# Patient Record
Sex: MALE | Race: WHITE | NOT HISPANIC OR LATINO | URBAN - METROPOLITAN AREA
[De-identification: names, ages, dates, MRNs, and addresses within clinical notes are randomized per-mention and may not be internally consistent; named-entity substitution may affect disease eponyms.]

---

## 2018-08-24 LAB — CRC RECOMMENDATION EXT: NORMAL

## 2021-04-16 ENCOUNTER — OFFICE VISIT (OUTPATIENT)
Dept: FAMILY MEDICINE | Facility: CLINIC | Age: 56
End: 2021-04-16
Payer: MEDICAID

## 2021-04-16 VITALS
TEMPERATURE: 97 F | RESPIRATION RATE: 16 BRPM | BODY MASS INDEX: 34.57 KG/M2 | DIASTOLIC BLOOD PRESSURE: 86 MMHG | HEART RATE: 70 BPM | WEIGHT: 278 LBS | OXYGEN SATURATION: 94 % | HEIGHT: 75 IN | SYSTOLIC BLOOD PRESSURE: 132 MMHG

## 2021-04-16 DIAGNOSIS — L30.9 DERMATITIS: ICD-10-CM

## 2021-04-16 DIAGNOSIS — Z76.89 ENCOUNTER TO ESTABLISH CARE: Primary | ICD-10-CM

## 2021-04-16 DIAGNOSIS — Z00.00 LABORATORY EXAM ORDERED AS PART OF ROUTINE GENERAL MEDICAL EXAMINATION: ICD-10-CM

## 2021-04-16 DIAGNOSIS — J45.909 ASTHMA, UNSPECIFIED ASTHMA SEVERITY, UNSPECIFIED WHETHER COMPLICATED, UNSPECIFIED WHETHER PERSISTENT: ICD-10-CM

## 2021-04-16 PROCEDURE — 99203 PR OFFICE/OUTPT VISIT, NEW, LEVL III, 30-44 MIN: ICD-10-PCS | Mod: S$GLB,,, | Performed by: NURSE PRACTITIONER

## 2021-04-16 PROCEDURE — 99203 OFFICE O/P NEW LOW 30 MIN: CPT | Mod: S$GLB,,, | Performed by: NURSE PRACTITIONER

## 2021-04-16 RX ORDER — BETAMETHASONE DIPROPIONATE 0.5 MG/G
OINTMENT TOPICAL 2 TIMES DAILY
Qty: 15 G | Refills: 0 | Status: SHIPPED | OUTPATIENT
Start: 2021-04-16 | End: 2021-04-16 | Stop reason: CLARIF

## 2021-04-16 RX ORDER — ALBUTEROL SULFATE 90 UG/1
2 AEROSOL, METERED RESPIRATORY (INHALATION) EVERY 6 HOURS PRN
Qty: 18 G | Refills: 0 | Status: SHIPPED | OUTPATIENT
Start: 2021-04-16 | End: 2021-05-10

## 2021-04-16 RX ORDER — PREDNISONE 10 MG/1
TABLET ORAL
Qty: 15 TABLET | Refills: 0 | Status: SHIPPED | OUTPATIENT
Start: 2021-04-16

## 2021-04-16 RX ORDER — ALFUZOSIN HYDROCHLORIDE 10 MG/1
1 TABLET, EXTENDED RELEASE ORAL DAILY
COMMUNITY
Start: 2021-03-25

## 2021-04-16 RX ORDER — MONTELUKAST SODIUM 10 MG/1
10 TABLET ORAL NIGHTLY
Qty: 30 TABLET | Refills: 5 | Status: SHIPPED | OUTPATIENT
Start: 2021-04-16 | End: 2021-06-28

## 2021-04-16 RX ORDER — FLUTICASONE PROPIONATE AND SALMETEROL XINAFOATE 115; 21 UG/1; UG/1
2 AEROSOL, METERED RESPIRATORY (INHALATION) EVERY 12 HOURS
Qty: 12 G | Refills: 3 | Status: SHIPPED | OUTPATIENT
Start: 2021-04-16 | End: 2021-06-28 | Stop reason: SDUPTHER

## 2021-04-16 RX ORDER — CLOTRIMAZOLE AND BETAMETHASONE DIPROPIONATE 10; .64 MG/G; MG/G
CREAM TOPICAL 2 TIMES DAILY
Qty: 15 G | Refills: 0 | Status: SHIPPED | OUTPATIENT
Start: 2021-04-16 | End: 2021-04-23

## 2021-04-22 ENCOUNTER — TELEPHONE (OUTPATIENT)
Dept: PULMONOLOGY | Facility: CLINIC | Age: 56
End: 2021-04-22

## 2021-04-23 ENCOUNTER — TELEPHONE (OUTPATIENT)
Dept: PULMONOLOGY | Facility: CLINIC | Age: 56
End: 2021-04-23

## 2021-04-23 DIAGNOSIS — J45.909 ASTHMA, UNSPECIFIED ASTHMA SEVERITY, UNSPECIFIED WHETHER COMPLICATED, UNSPECIFIED WHETHER PERSISTENT: Primary | ICD-10-CM

## 2021-04-23 DIAGNOSIS — J44.9 CHRONIC OBSTRUCTIVE PULMONARY DISEASE, UNSPECIFIED COPD TYPE: ICD-10-CM

## 2021-04-23 LAB
ABS NRBC COUNT: 0 X 10 3/UL (ref 0–0.01)
ABSOLUTE BASOPHIL: 0.05 X 10 3/UL (ref 0–0.22)
ABSOLUTE EOSINOPHIL: 0.69 X 10 3/UL (ref 0.04–0.54)
ABSOLUTE IMMATURE GRAN: 0.02 X 10 3/UL (ref 0–0.04)
ABSOLUTE LYMPHOCYTE: 2.51 X 10 3/UL (ref 0.86–4.75)
ABSOLUTE MONOCYTE: 0.88 X 10 3/UL (ref 0.22–1.08)
ALBUMIN SERPL-MCNC: 4 G/DL (ref 3.5–5.2)
ALBUMIN/GLOB SERPL ELPH: 1.3 {RATIO} (ref 1–2.7)
ALP ISOS SERPL LEV INH-CCNC: 109 U/L (ref 40–130)
ALT (SGPT): 7 U/L (ref 0–41)
ANION GAP SERPL CALC-SCNC: 9 MMOL/L (ref 8–17)
AST SERPL-CCNC: 15 U/L (ref 0–40)
BASOPHILS NFR BLD: 0.7 % (ref 0.2–1.2)
BILIRUBIN, TOTAL: 0.39 MG/DL (ref 0–1.2)
BUN/CREAT SERPL: 17.8 (ref 6–20)
CALCIUM SERPL-MCNC: 8.6 MG/DL (ref 8.6–10.2)
CARBON DIOXIDE, CO2: 22 MMOL/L (ref 22–29)
CHLORIDE: 108 MMOL/L (ref 98–107)
CHOLEST SERPL-MSCNC: 159 MG/DL (ref 100–200)
CREAT SERPL-MCNC: 0.87 MG/DL (ref 0.7–1.2)
EOSINOPHIL NFR BLD: 9.1 % (ref 0.7–7)
GFR ESTIMATION: 90.45
GLOBULIN: 3 G/DL (ref 1.5–4.5)
GLUCOSE: 91 MG/DL (ref 74–106)
HCT VFR BLD AUTO: 45.9 % (ref 42–52)
HDLC SERPL-MCNC: 43 MG/DL
HGB BLD-MCNC: 14.4 G/DL (ref 14–18)
IMMATURE GRANULOCYTES: 0.3 % (ref 0–0.5)
LDL/HDL RATIO: 2.2 (ref 1–3)
LDLC SERPL CALC-MCNC: 93.2 MG/DL (ref 0–100)
LYMPHOCYTES NFR BLD: 33.1 % (ref 19.3–53.1)
MCH RBC QN AUTO: 29.2 PG (ref 27–32)
MCHC RBC AUTO-ENTMCNC: 31.4 G/DL (ref 32–36)
MCV RBC AUTO: 93.1 FL (ref 80–94)
MONOCYTES NFR BLD: 11.6 % (ref 4.7–12.5)
NEUTROPHILS # BLD AUTO: 3.44 X 10 3/UL (ref 2.15–7.56)
NEUTROPHILS NFR BLD: 45.2 % (ref 34–71.1)
NUCLEATED RED BLOOD CELLS: 0 /100 WBC (ref 0–0.2)
PLATELET # BLD AUTO: 222 X 10 3/UL (ref 135–400)
POTASSIUM: 4 MMOL/L (ref 3.5–5.1)
PROT SNV-MCNC: 7 G/DL (ref 6.4–8.3)
RBC # BLD AUTO: 4.93 X 10 6/UL (ref 4.7–6.1)
RDW-SD: 46.6 FL (ref 37–54)
SODIUM: 139 MMOL/L (ref 136–145)
TRIGL SERPL-MCNC: 114 MG/DL (ref 0–150)
TSH W/REFLEX TO FT4: 2.68 UIU/ML (ref 0.27–4.2)
UREA NITROGEN (BUN): 15.5 MG/DL (ref 6–20)
WBC # BLD: 7.59 X 10 3/UL (ref 4.3–10.8)

## 2021-05-03 ENCOUNTER — TELEPHONE (OUTPATIENT)
Dept: PULMONOLOGY | Facility: CLINIC | Age: 56
End: 2021-05-03

## 2021-05-08 DIAGNOSIS — J45.909 ASTHMA, UNSPECIFIED ASTHMA SEVERITY, UNSPECIFIED WHETHER COMPLICATED, UNSPECIFIED WHETHER PERSISTENT: ICD-10-CM

## 2021-05-10 RX ORDER — ALBUTEROL SULFATE 90 UG/1
AEROSOL, METERED RESPIRATORY (INHALATION)
Qty: 8.5 G | Refills: 1 | Status: SHIPPED | OUTPATIENT
Start: 2021-05-10 | End: 2022-06-27 | Stop reason: SDUPTHER

## 2021-05-11 ENCOUNTER — OFFICE VISIT (OUTPATIENT)
Dept: FAMILY MEDICINE | Facility: CLINIC | Age: 56
End: 2021-05-11
Payer: MEDICAID

## 2021-05-11 VITALS
HEART RATE: 74 BPM | WEIGHT: 265.63 LBS | BODY MASS INDEX: 33.03 KG/M2 | RESPIRATION RATE: 20 BRPM | DIASTOLIC BLOOD PRESSURE: 76 MMHG | OXYGEN SATURATION: 98 % | SYSTOLIC BLOOD PRESSURE: 119 MMHG | TEMPERATURE: 99 F | HEIGHT: 75 IN

## 2021-05-11 DIAGNOSIS — J45.909 ASTHMA, UNSPECIFIED ASTHMA SEVERITY, UNSPECIFIED WHETHER COMPLICATED, UNSPECIFIED WHETHER PERSISTENT: Primary | ICD-10-CM

## 2021-05-11 PROCEDURE — 99213 PR OFFICE/OUTPT VISIT, EST, LEVL III, 20-29 MIN: ICD-10-PCS | Mod: S$GLB,,, | Performed by: NURSE PRACTITIONER

## 2021-05-11 PROCEDURE — 99213 OFFICE O/P EST LOW 20 MIN: CPT | Mod: S$GLB,,, | Performed by: NURSE PRACTITIONER

## 2021-05-13 ENCOUNTER — TELEPHONE (OUTPATIENT)
Dept: PULMONOLOGY | Facility: CLINIC | Age: 56
End: 2021-05-13

## 2021-05-25 ENCOUNTER — TELEPHONE (OUTPATIENT)
Dept: PULMONOLOGY | Facility: CLINIC | Age: 56
End: 2021-05-25

## 2021-06-28 ENCOUNTER — OFFICE VISIT (OUTPATIENT)
Dept: FAMILY MEDICINE | Facility: CLINIC | Age: 56
End: 2021-06-28
Payer: MEDICAID

## 2021-06-28 VITALS
BODY MASS INDEX: 33.07 KG/M2 | WEIGHT: 266 LBS | HEIGHT: 75 IN | TEMPERATURE: 99 F | SYSTOLIC BLOOD PRESSURE: 127 MMHG | HEART RATE: 80 BPM | OXYGEN SATURATION: 96 % | DIASTOLIC BLOOD PRESSURE: 90 MMHG

## 2021-06-28 DIAGNOSIS — J45.909 ASTHMA, UNSPECIFIED ASTHMA SEVERITY, UNSPECIFIED WHETHER COMPLICATED, UNSPECIFIED WHETHER PERSISTENT: Primary | ICD-10-CM

## 2021-06-28 DIAGNOSIS — E78.5 HYPERLIPIDEMIA, UNSPECIFIED HYPERLIPIDEMIA TYPE: ICD-10-CM

## 2021-06-28 DIAGNOSIS — R53.83 FATIGUE, UNSPECIFIED TYPE: ICD-10-CM

## 2021-06-28 DIAGNOSIS — L98.9 SKIN LESION OF BACK: ICD-10-CM

## 2021-06-28 DIAGNOSIS — N40.0 BENIGN PROSTATIC HYPERPLASIA, UNSPECIFIED WHETHER LOWER URINARY TRACT SYMPTOMS PRESENT: ICD-10-CM

## 2021-06-28 PROCEDURE — 99214 PR OFFICE/OUTPT VISIT, EST, LEVL IV, 30-39 MIN: ICD-10-PCS | Mod: S$GLB,,, | Performed by: INTERNAL MEDICINE

## 2021-06-28 PROCEDURE — 99214 OFFICE O/P EST MOD 30 MIN: CPT | Mod: S$GLB,,, | Performed by: INTERNAL MEDICINE

## 2021-06-28 RX ORDER — FLUTICASONE PROPIONATE AND SALMETEROL XINAFOATE 115; 21 UG/1; UG/1
2 AEROSOL, METERED RESPIRATORY (INHALATION) EVERY 12 HOURS
Qty: 36 G | Refills: 3 | Status: SHIPPED | OUTPATIENT
Start: 2021-06-28 | End: 2022-03-24 | Stop reason: ALTCHOICE

## 2021-07-06 ENCOUNTER — CLINICAL SUPPORT (OUTPATIENT)
Dept: PULMONOLOGY | Facility: CLINIC | Age: 56
End: 2021-07-06
Payer: MEDICAID

## 2021-07-06 DIAGNOSIS — J44.9 CHRONIC OBSTRUCTIVE PULMONARY DISEASE, UNSPECIFIED COPD TYPE: ICD-10-CM

## 2021-07-06 DIAGNOSIS — J45.909 ASTHMA, UNSPECIFIED ASTHMA SEVERITY, UNSPECIFIED WHETHER COMPLICATED, UNSPECIFIED WHETHER PERSISTENT: ICD-10-CM

## 2021-07-06 PROCEDURE — 94060 PR EVAL OF BRONCHOSPASM: ICD-10-PCS | Mod: S$GLB,,, | Performed by: INTERNAL MEDICINE

## 2021-07-06 PROCEDURE — 94729 DIFFUSING CAPACITY: CPT | Mod: S$GLB,,, | Performed by: INTERNAL MEDICINE

## 2021-07-06 PROCEDURE — 94060 EVALUATION OF WHEEZING: CPT | Mod: S$GLB,,, | Performed by: INTERNAL MEDICINE

## 2021-07-06 PROCEDURE — 94729 PR C02/MEMBANE DIFFUSE CAPACITY: ICD-10-PCS | Mod: S$GLB,,, | Performed by: INTERNAL MEDICINE

## 2021-12-06 ENCOUNTER — PATIENT MESSAGE (OUTPATIENT)
Dept: RESEARCH | Facility: HOSPITAL | Age: 56
End: 2021-12-06
Payer: MEDICAID

## 2021-12-20 ENCOUNTER — TELEPHONE (OUTPATIENT)
Dept: PULMONOLOGY | Facility: CLINIC | Age: 56
End: 2021-12-20
Payer: MEDICAID

## 2022-03-24 ENCOUNTER — OFFICE VISIT (OUTPATIENT)
Dept: PULMONOLOGY | Facility: CLINIC | Age: 57
End: 2022-03-24
Payer: MEDICAID

## 2022-03-24 VITALS
BODY MASS INDEX: 33.32 KG/M2 | RESPIRATION RATE: 18 BRPM | DIASTOLIC BLOOD PRESSURE: 72 MMHG | OXYGEN SATURATION: 97 % | HEIGHT: 75 IN | WEIGHT: 268 LBS | SYSTOLIC BLOOD PRESSURE: 120 MMHG | HEART RATE: 96 BPM

## 2022-03-24 DIAGNOSIS — G47.33 OSA (OBSTRUCTIVE SLEEP APNEA): ICD-10-CM

## 2022-03-24 DIAGNOSIS — J44.9 CHRONIC OBSTRUCTIVE PULMONARY DISEASE, UNSPECIFIED COPD TYPE: Primary | ICD-10-CM

## 2022-03-24 DIAGNOSIS — J45.909 SEVERE ASTHMA WITHOUT COMPLICATION, UNSPECIFIED WHETHER PERSISTENT: Primary | ICD-10-CM

## 2022-03-24 DIAGNOSIS — J45.909 ASTHMA, UNSPECIFIED ASTHMA SEVERITY, UNSPECIFIED WHETHER COMPLICATED, UNSPECIFIED WHETHER PERSISTENT: ICD-10-CM

## 2022-03-24 PROCEDURE — 99215 OFFICE O/P EST HI 40 MIN: CPT | Mod: S$GLB,,, | Performed by: INTERNAL MEDICINE

## 2022-03-24 PROCEDURE — 99215 PR OFFICE/OUTPT VISIT, EST, LEVL V, 40-54 MIN: ICD-10-PCS | Mod: S$GLB,,, | Performed by: INTERNAL MEDICINE

## 2022-03-24 RX ORDER — MONTELUKAST SODIUM 10 MG/1
10 TABLET ORAL NIGHTLY
Qty: 30 TABLET | Refills: 0 | Status: SHIPPED | OUTPATIENT
Start: 2022-03-24 | End: 2022-04-25

## 2022-03-24 RX ORDER — BUDESONIDE AND FORMOTEROL FUMARATE DIHYDRATE 160; 4.5 UG/1; UG/1
2 AEROSOL RESPIRATORY (INHALATION) EVERY 12 HOURS
Qty: 0.09 G | Refills: 5 | Status: SHIPPED | OUTPATIENT
Start: 2022-03-24 | End: 2022-06-27 | Stop reason: SDUPTHER

## 2022-03-24 NOTE — PROGRESS NOTES
Subjective:    Patient Identification:   Patient ID: Kevin Singh is a 56 y.o. male.    Referring Provider:   Becka Ramos NP    Chief Complaint:  Asthma      History of Present Illness:    Kevin Singh is a 56 y.o. male who presents for the evaluation and management of his asthma.    Patient was diagnosed with asthma many years ago but has not had any acute attacks for long time until last year when he had an acute exacerbation which was treated with albuterol and steroids.  He mentions that he is much better but still has wheezing mostly at nighttime.  He denies any cardiac history and has not seen a cardiologist.  He denies any chest pain, lower extremity swelling, any symptoms consistent with orthopnea or paroxysmal nocturnal dyspnea.  He also has some nasal congestion but does not use regularly nasal steroids.  He denies any history of hypertension.  He does not have any pets at home.  He has extensive history of working in [employer] and is currently at [employer].  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  He mentions that his chest is always congested but he is feeling better right now.  He does use Claritin D.  He is currently not on Singulair which is listed under his medications.  He was given Advair with moderate improvement in his symptoms.  He does have a rescue inhaler that he is currently using once a week but does not use at nighttime.  He thinks that he needs to use it more often and more at nighttime as well.  Last year he had to go to ER twice for his asthma and symptoms were improved after he got steroid shots.    He denies any upper airway or nasal surgeries.  His father who has  had obstructive sleep apnea and was on CPAP.  He rarely drinks alcohol.  He denies any alcohol or tobacco abuse.  He has been told that he snores loudly and is access also complained about him stop breathing during his sleep and waking up gasping for  air.  He upon awakening in the morning he feels tired, groggy and fatigued throughout.  He has dry mouth which she attributes to Claritin D. He is sleepy and tired throughout the day.  On his days off he takes 30-60 minute naps.  He falls asleep easily while watching TV etc..  His [place] Sleepiness Scale score is 12 and neck circumference is 17.75 in.  His stop Bang score is elevated.    Review of Systems:  Review of Systems   Constitutional: Positive for fatigue. Negative for fever, chills, weight loss, weight gain, activity change, appetite change, night sweats and weakness.   HENT: Negative for nosebleeds, postnasal drip, rhinorrhea, sinus pressure, sore throat, trouble swallowing, voice change, congestion, ear pain and hearing loss.    Eyes: Negative for redness and itching.   Respiratory: Positive for apnea, snoring, cough, wheezing and somnolence. Negative for hemoptysis, sputum production, choking, chest tightness, shortness of breath, orthopnea, previous hospitialization due to pulmonary problems, asthma nighttime symptoms, pleurisy, dyspnea on extertion, use of rescue inhaler and Paroxysmal Nocturnal Dyspnea.    Cardiovascular: Negative for chest pain, palpitations and leg swelling.   Genitourinary: Negative for difficulty urinating and hematuria.   Endocrine: Negative for polydipsia, polyphagia, cold intolerance, heat intolerance and polyuria.    Musculoskeletal: Negative for arthralgias, back pain, gait problem, joint swelling and myalgias.   Skin: Negative for rash.   Gastrointestinal: Negative for nausea, vomiting, abdominal pain, abdominal distention and acid reflux.   Neurological: Negative for dizziness, syncope, weakness, light-headedness and headaches.   Hematological: Negative for adenopathy. Does not bruise/bleed easily and no excessive bruising.   Psychiatric/Behavioral: Negative for confusion and sleep disturbance. The patient is not nervous/anxious.      Allergies: Review of patient's  allergies indicates:  No Known Allergies    Medications:      Past Medical History:      Past Medical History:   Diagnosis Date    Asthma     Enlarged prostate        Family History:      Family History   Problem Relation Age of Onset    Multiple sclerosis Mother     Diabetes Father     Hypertension Father     Prostatitis Father         Social History:      Past Surgical History:   Procedure Laterality Date    HERNIA REPAIR      SINUS SURGERY      polups        Physical Exam:  Vitals:    03/24/22 1150   BP: 120/72   Pulse: 96   Resp: 18     Physical Exam   Constitutional: He is oriented to person, place, and time. He appears not cachectic. No distress. He is obese.   HENT:   Head: Normocephalic.   Right Ear: External ear normal.   Left Ear: External ear normal.   Nose: Nose normal. No mucosal edema. No polyps.   Mouth/Throat: Oropharynx is clear and moist. Normal dentition. No oropharyngeal exudate. Mallampati Score: II.   Neck: No JVD present. No tracheal deviation present. No thyromegaly present.   Cardiovascular: Normal rate, regular rhythm, normal heart sounds and intact distal pulses. Exam reveals no gallop and no friction rub.   No murmur heard.  Pulmonary/Chest: Normal expansion, symmetric chest wall expansion and effort normal. No stridor. No respiratory distress. He has no decreased breath sounds. He has wheezes. He has no rhonchi. He has no rales. Chest wall is not dull to percussion. He exhibits no tenderness. Negative for egophony. Negative for tactile fremitus.   Abdominal: Soft. Bowel sounds are normal. He exhibits no distension and no mass. There is no hepatosplenomegaly. There is no abdominal tenderness. There is no rebound and no guarding. No hernia.   Musculoskeletal:         General: No tenderness, deformity or edema. Normal range of motion.      Cervical back: Normal range of motion and neck supple.   Lymphadenopathy: No supraclavicular adenopathy is present.     He has no cervical  adenopathy.     He has no axillary adenopathy.   Neurological: He is alert and oriented to person, place, and time. He has normal reflexes. He displays normal reflexes. No cranial nerve deficit. He exhibits normal muscle tone.   Skin: Skin is warm and dry. No rash noted. He is not diaphoretic. No cyanosis or erythema. No pallor. Nails show no clubbing.   Psychiatric: He has a normal mood and affect. His behavior is normal. Judgment and thought content normal.                 Accessory Clinical Data:    Chest x-ray:  I personally reviewed his chest x-ray which does not show any acute pulmonary abnormalities.    ESS = 12    STOPBANG = 5/8    Do snore loudly ? y  Do of feel tired, fatigued or sleepy during the daytime ? y  Has any when he observed to stop breathing during sleep ? y  To have or are you being treated for high blood pressure ? n  BMI > 35 ? n  Age > 50 years ? y  Neck circumference > 40 cm ? y  Gender ? m    CT scan:  None available     PFTs:  Mild mixed obstructive and restrictive defect with normal diffusion capacity.  Bronchodilator response was not clinically significant.  Expiratory limb of flow volume loop was mildly scooped.    6MWT:  None available    TTE:  None available    Lab data:    All radiographic imaging of the chest, PFT tracings/data, and 6MWT data have been independently reviewed and interpreted unless otherwise specified.     Assessment and Plan:        Problem List Items Addressed This Visit        Pulmonary    Asthma - Primary    Relevant Orders    CBC w/ Manual Differential    IgE      Other Visit Diagnoses     JUAN FRANCISCO (obstructive sleep apnea)        Relevant Orders    Home Sleep Studies         Orders Placed This Encounter   Procedures    CBC w/ Manual Differential     Standing Status:   Future     Number of Occurrences:   1     Standing Expiration Date:   5/23/2023    IgE     Standing Status:   Future     Number of Occurrences:   1     Standing Expiration Date:   5/23/2023    Home  Sleep Studies     Standing Status:   Future     Standing Expiration Date:   3/24/2023      Patient has uncontrolled severe persistent asthma.  Stop Advair and start Symbicort 2 puffs twice a day at 160 mcg maximum dose.  Add Spiriva Respimat 2.5 mcg 2 puffs once a day.  Prescribed Singulair and continue with Claritin and use intranasal corticosteroids on scheduled basis every night.  Avoid triggers an any known allergens.  Inform clinic for any acute exacerbations.  Prescribed nebulizer and start albuterol with ipratropium nebulizers as needed.  Continue to use albuterol rescue inhaler at work and outside for shortness of breath or wheezing.  Obtain baseline eosinophil count and IgE levels.  Patient has classic symptoms of undiagnosed obstructive sleep apnea.  Diagnosis and control of sleep disordered breathing will also help with control of asthma.  Patient has high pretest probability for moderate to severe obstructive sleep apnea, therefore, we will schedule patient for home sleep test and follow up on the results.  It should be noted however, that a negative home sleep test will not rule out obstructive sleep apnea.  In such a scenario patient will need an in-lab split night study.    More than 50% of 60 min time was spent face-to-face on counseling, in reviewing the imaging studies, reviewing notes from primary care provider, performing appropriate examination, counseling and educating the patient regarding the findings on PFTs/CXR, ordering medications and appropriate follow-up lab studies, documenting clinical information in the electronic medical record and care coordination.      Follow up in about 3 months (around 6/24/2022).  Thank you very much for involving me in the care of this patient.  Please do not hesitate to reach me if you have any further questions or concerns.    This note is dictated on M*Modal word recognition program.  There are word recognition mistakes that are occasionally missed on  review.

## 2022-03-31 ENCOUNTER — OUTSIDE PLACE OF SERVICE (OUTPATIENT)
Dept: PULMONOLOGY | Facility: CLINIC | Age: 57
End: 2022-03-31
Payer: MEDICAID

## 2022-04-08 PROCEDURE — 95806 PR SLEEP STUDY, UNATTENDED, SIMUL RECORD HR/O2 SAT/RESP FLOW/RESP EFFT: ICD-10-PCS | Mod: 26,,, | Performed by: INTERNAL MEDICINE

## 2022-04-08 PROCEDURE — 95806 SLEEP STUDY UNATT&RESP EFFT: CPT | Mod: 26,,, | Performed by: INTERNAL MEDICINE

## 2022-06-27 ENCOUNTER — OFFICE VISIT (OUTPATIENT)
Dept: PULMONOLOGY | Facility: CLINIC | Age: 57
End: 2022-06-27
Payer: MEDICAID

## 2022-06-27 VITALS
DIASTOLIC BLOOD PRESSURE: 68 MMHG | WEIGHT: 271.63 LBS | HEART RATE: 92 BPM | BODY MASS INDEX: 33.77 KG/M2 | HEIGHT: 75 IN | OXYGEN SATURATION: 94 % | RESPIRATION RATE: 18 BRPM | SYSTOLIC BLOOD PRESSURE: 144 MMHG

## 2022-06-27 DIAGNOSIS — J45.909 ASTHMA, UNSPECIFIED ASTHMA SEVERITY, UNSPECIFIED WHETHER COMPLICATED, UNSPECIFIED WHETHER PERSISTENT: ICD-10-CM

## 2022-06-27 PROCEDURE — 3077F PR MOST RECENT SYSTOLIC BLOOD PRESSURE >= 140 MM HG: ICD-10-PCS | Mod: CPTII,S$GLB,,

## 2022-06-27 PROCEDURE — 3077F SYST BP >= 140 MM HG: CPT | Mod: CPTII,S$GLB,,

## 2022-06-27 PROCEDURE — 3078F PR MOST RECENT DIASTOLIC BLOOD PRESSURE < 80 MM HG: ICD-10-PCS | Mod: CPTII,S$GLB,,

## 2022-06-27 PROCEDURE — 3008F BODY MASS INDEX DOCD: CPT | Mod: CPTII,S$GLB,,

## 2022-06-27 PROCEDURE — 1159F MED LIST DOCD IN RCRD: CPT | Mod: CPTII,S$GLB,,

## 2022-06-27 PROCEDURE — 1159F PR MEDICATION LIST DOCUMENTED IN MEDICAL RECORD: ICD-10-PCS | Mod: CPTII,S$GLB,,

## 2022-06-27 PROCEDURE — 3078F DIAST BP <80 MM HG: CPT | Mod: CPTII,S$GLB,,

## 2022-06-27 PROCEDURE — 99214 OFFICE O/P EST MOD 30 MIN: CPT | Mod: S$GLB,,,

## 2022-06-27 PROCEDURE — 3008F PR BODY MASS INDEX (BMI) DOCUMENTED: ICD-10-PCS | Mod: CPTII,S$GLB,,

## 2022-06-27 PROCEDURE — 99214 PR OFFICE/OUTPT VISIT, EST, LEVL IV, 30-39 MIN: ICD-10-PCS | Mod: S$GLB,,,

## 2022-06-27 RX ORDER — ALBUTEROL SULFATE 90 UG/1
2 AEROSOL, METERED RESPIRATORY (INHALATION) EVERY 6 HOURS
Qty: 8.5 G | Refills: 1 | Status: SHIPPED | OUTPATIENT
Start: 2022-06-27 | End: 2022-12-01 | Stop reason: SDUPTHER

## 2022-06-27 RX ORDER — BUDESONIDE AND FORMOTEROL FUMARATE DIHYDRATE 160; 4.5 UG/1; UG/1
2 AEROSOL RESPIRATORY (INHALATION) EVERY 12 HOURS
Qty: 0.09 G | Refills: 5 | Status: SHIPPED | OUTPATIENT
Start: 2022-06-27 | End: 2022-12-01 | Stop reason: SDUPTHER

## 2022-06-27 NOTE — PROGRESS NOTES
Subjective:    Patient Identification:  Patient ID: Kevin Singh is a 57 y.o. male.    Referring Provider:  No ref. provider found     Chief Complaint:  Asthma      History of Present Illness:    Kevin Singh is a 57 y.o. male who presents for a follow up of asthma and JUAN FRANCISCO.   Last visit he was started on Symbicort max therapy and Spiriva respimat 2.5 mg daily. He was instructed to continue clairitin and was also given singulair and instructed touse intranasal corticosteroids on scheduled basis every night. His baseline IgE and eosinophil count results are back, showing IgE level of 888 and eosinophils 10%. Since his visit he has not been taking any of the inhalers or singulair on a regular basis. He states he feels better but still has a lot of nasal congestion.    CPAP machine was ordered today. We will follow up in 3 months with compliance report.     Review of Systems:  Review of Systems   Constitutional: Negative for fever, chills, appetite change, night sweats and weakness.   HENT: Positive for sinus pressure and congestion. Negative for postnasal drip, rhinorrhea, sore throat, trouble swallowing, voice change and ear pain.    Respiratory: Negative for hemoptysis.    Cardiovascular: Negative for chest pain, palpitations and leg swelling.   Genitourinary: Negative for difficulty urinating and hematuria.   Endocrine: Negative for polydipsia, polyphagia, cold intolerance, heat intolerance and polyuria.    Musculoskeletal: Negative for joint swelling and myalgias.   Skin: Negative for rash.   Gastrointestinal: Negative for nausea, vomiting, abdominal pain and abdominal distention.   Neurological: Negative for dizziness, syncope, light-headedness and headaches.   Psychiatric/Behavioral: Negative for confusion and sleep disturbance. The patient is not nervous/anxious.        Allergies: Review of patient's allergies indicates:  No Known Allergies    Medications:      Past Medical History:      Past Medical History:    Diagnosis Date    Asthma     Enlarged prostate        Family History:      Family History   Problem Relation Age of Onset    Multiple sclerosis Mother     Diabetes Father     Hypertension Father     Prostatitis Father         Social History:      Past Surgical History:   Procedure Laterality Date    HERNIA REPAIR      SINUS SURGERY      polups        Physical Exam:  Vitals:    06/27/22 1140   BP: (!) 144/68   Pulse: 92   Resp: 18     Physical Exam   Constitutional: He is oriented to person, place, and time. He appears not cachectic. No distress.   HENT:   Head: Normocephalic.   Right Ear: External ear normal.   Left Ear: External ear normal.   Nose: Mucosal edema present. No polyps.   Mouth/Throat: Oropharynx is clear and moist. Normal dentition. No oropharyngeal exudate. Mallampati Score: III.   Neck: No JVD present. No tracheal deviation present. No thyromegaly present.   Cardiovascular: Normal rate, regular rhythm, normal heart sounds and intact distal pulses. Exam reveals no gallop and no friction rub.   No murmur heard.  Pulmonary/Chest: Normal expansion, symmetric chest wall expansion, effort normal and breath sounds normal. No stridor. No respiratory distress. He has no decreased breath sounds. He has no wheezes. He has no rhonchi. He has no rales. Chest wall is not dull to percussion. He exhibits no tenderness. Negative for egophony. Negative for tactile fremitus.   Abdominal: Soft. Bowel sounds are normal. He exhibits no distension and no mass. There is no hepatosplenomegaly. There is no abdominal tenderness. There is no rebound and no guarding. No hernia.   Musculoskeletal:         General: No tenderness, deformity or edema. Normal range of motion.      Cervical back: Normal range of motion and neck supple.   Lymphadenopathy: No supraclavicular adenopathy is present.     He has no cervical adenopathy.     He has no axillary adenopathy.   Neurological: He is alert and oriented to person, place,  and time. He has normal reflexes. He displays normal reflexes. No cranial nerve deficit. He exhibits normal muscle tone.   Skin: Skin is warm and dry. No rash noted. He is not diaphoretic. No cyanosis or erythema. No pallor. Nails show no clubbing.   Psychiatric: He has a normal mood and affect. His behavior is normal. Judgment and thought content normal.           No results found for: PREFEV1, SIF0NQODSL, PREFVC, FVCPREREF, INYYJT9ZUN, WDS0OEXJWDK, ANWJ9GEY, HHUP3SIV, PREDLCO, DLCOSBPRRF, DLCOADJPRE, DLCOCSBRPRRF, POSTFEV1, POSTFVC, LSGKYBX4HZF   X-Ray Chest PA And Lateral                                RADIOLOGY REPORT        PT NAME: NIRAJ KHAN      [facility]     : 1965 M 56             [address]    ACCT: [id number]                                              [facility] Rec #: [id number]                                        [id number]    Patient Location: [facility]/             Procedure: CHEST 2 VIEWS    REQUISITION #: [id number]      REPORT #: [id number]           DATE OF EXAM: 22    TIME OF EXAM:            PROCEDURE: CHEST 2 VIEWS    CLINICAL DATA:    Shortness of breath        TECHNIQUE:    Views:  PA and lateral views of the chest.    Limitations: The images are technically satisfactory.        COMPARISON:    No prior relevant studies are available.        FINDINGS:    Cardiovascular:    1. Chronic atherosclerotic changes noted in the aorta.        Lungs and pleura: Normal.        Mediastinal and hilar structures: Normal.        Osseous structures: Normal.        Additional findings: None seen.        IMPRESSION:    1.  Negative examination.            This document was created using a voice recognition transcribing system.   Incorrect words or phrases may have been missed during proof reading. Please   interpret accordingly or contact the radiologist for clarification if   necessary.        DICTATING PHYSICIAN:  GRISELDA FREITAS MD                   Date  Dictated: 03/24/22 1118        Signed By:  GRISELDA FREITAS MD <Electronically signed by GRISELDA FREITAS MD in OV>    Date Signed:  03/24/22 1118     CC: KALANI AMBROCIO MD ; KALANI AMBROCIO MD      ADMITTING PHYSICIAN:                                                                                                    ORDERING PHY: KALANI AMBROCIO MD                                                                                                                                                      ATTENDING PHY: KALANI AMBROCIO MD    Patient Status:  REG CLI    Admit Service Date: 03/24/22                Accessory Clinical Data:  Chest x-ray:    CT scan:     PFTs:     6MWT:     TTE:    Lab data: IgE and Eosinophils dictated above.     All radiographic imaging of the chest, PFT tracings/data, and 6MWT data have been independently reviewed and interpreted unless otherwise specified.     Assessment and Plan:      Problem List Items Addressed This Visit        Pulmonary    Asthma    Current Assessment & Plan     Discussed the option of using FASENRA for frequent exacerbations requiring steroids. Since his last visit he has gone to urgent care and had to have a steroid injection. There is a concern with the ability to take the injections on time due to his work schedule.     For now he would like to try to take the Singulair and clairitin every day as prescribed previously. He thought he could stop taking it once he felt better. For some reason he did not get the Sipriva inhaler. I will re-order this. Continue using Symbicort 2 puffs BID every day.     We will follow up in 3 months for CPAP compliance and we will follow up on symptomatic improvement with current regimen.           Relevant Medications    albuterol (PROVENTIL/VENTOLIN HFA) 90 mcg/actuation inhaler         No orders of the defined types were placed in this encounter.           Follow up in about 3 months (around 9/27/2022).

## 2022-06-27 NOTE — ASSESSMENT & PLAN NOTE
Discussed the option of using FASENRA for frequent exacerbations requiring steroids. Since his last visit he has gone to urgent care and had to have a steroid injection. There is a concern with the ability to take the injections on time due to his work schedule.     For now he would like to try to take the Singulair and clairitin every day as prescribed previously. He thought he could stop taking it once he felt better. For some reason he did not get the Sipriva inhaler. I will re-order this. Continue using Symbicort 2 puffs BID every day.     We will follow up in 3 months for CPAP compliance and we will follow up on symptomatic improvement with current regimen.

## 2022-06-28 ENCOUNTER — TELEPHONE (OUTPATIENT)
Dept: HEMATOLOGY/ONCOLOGY | Facility: CLINIC | Age: 57
End: 2022-06-28
Payer: MEDICAID

## 2022-06-28 NOTE — TELEPHONE ENCOUNTER
----- Message from Evelin Coffey sent at 6/28/2022  9:25 AM CDT -----  Regarding: [facility]- discussing accts  Contact: Lissy  .Type:  Patient Returning Call    Who Called: René    Does the patient know what this is regarding?: denial  Would the patient rather a call back or a response via [facility]?  No callback necessary  Best Call Back Number: [phone]  Additional Information:  pt insurance is not accepted

## 2022-06-28 NOTE — TELEPHONE ENCOUNTER
Spoke to René from [facility] and [facility] to [facility] of [facility]. Carmen will fax orders to another place.

## 2022-07-08 ENCOUNTER — TELEPHONE (OUTPATIENT)
Dept: PULMONOLOGY | Facility: CLINIC | Age: 57
End: 2022-07-08
Payer: MEDICAID

## 2022-07-08 NOTE — TELEPHONE ENCOUNTER
Contacted [facility] she will an order for CPAP to be signed. LB  ----- Message from Berna Falcon sent at 7/8/2022  9:12 AM CDT -----  Contact: [facility]  Please call Arcelia from [facility] regarding a order for the patient to get a CPAP Machine    Ar[facility]ia need a signed order form sent over      Fax #  [phone]      Call back #  [phone]  ext. [phone]

## 2022-09-27 ENCOUNTER — TELEPHONE (OUTPATIENT)
Dept: PULMONOLOGY | Facility: CLINIC | Age: 57
End: 2022-09-27
Payer: MEDICAID

## 2022-09-27 NOTE — TELEPHONE ENCOUNTER
Called pollo for compliance report. Pt just got set up on September 6, 2022 but did not receive machine until 09/20/2022. Therefore it's been a week and pt needs to be seen 4-6 weeks after using cpap. I rescheduled pt to November 1st, 2022.

## 2022-10-28 ENCOUNTER — TELEPHONE (OUTPATIENT)
Dept: FAMILY MEDICINE | Facility: CLINIC | Age: 57
End: 2022-10-28
Payer: MEDICAID

## 2022-10-28 ENCOUNTER — TELEPHONE (OUTPATIENT)
Dept: PULMONOLOGY | Facility: CLINIC | Age: 57
End: 2022-10-28
Payer: MEDICAID

## 2022-10-28 NOTE — TELEPHONE ENCOUNTER
Returned patient call and rescheduled and confirmed appointment. LB  ----- Message from Edilberto Salgado sent at 10/28/2022 11:19 AM CDT -----  Contact: pt  .Type:  Patient Returning Call    Who Called:shireen   Who Left Message for Patient:  Does the patient know what this is regarding?:received call about appt time no longer working   Would the patient rather a call back or a response via [facility]?   Best Call Back Number:.[phone]    Additional Information:

## 2022-10-28 NOTE — TELEPHONE ENCOUNTER
----- Message from Diane Fraser LPN sent at 10/28/2022  1:36 PM CDT -----  Regarding: FW: spot on back  Contact: pt  Please notify patient that he is scheduled to see Nevaeh Chakraborty.NP on 11/4/22 at 1:20pm  ----- Message -----  From: Viktoria Humphrey LPN  Sent: 10/28/2022  12:42 PM CDT  To: Diane Fraser LPN  Subject: spot on back                                     Dr Quarles wanted to know if [facility] has anything available  next week  to see this patient? Thanks    ----- Message -----  From: Edilberto Salgado  Sent: 10/28/2022  11:18 AM CDT  To: Willam Mcnair    .Type:  Sooner Apoointment Request    Caller is requesting a sooner appointment.  Caller declined first available appointment listed below.  Caller will not accept being placed on the waitlist and is requesting a message be sent to doctor.  Name of Caller:shireen   When is the first available appointment?3/2023  Symptoms:spot on back   Would the patient rather a call back or a response via [facility]?   Best Call Back Number:.[phone]    Additional Information:

## 2022-11-04 ENCOUNTER — TELEPHONE (OUTPATIENT)
Dept: FAMILY MEDICINE | Facility: CLINIC | Age: 57
End: 2022-11-04

## 2022-11-04 NOTE — TELEPHONE ENCOUNTER
----- Message from Bridger Hope sent at 11/4/2022 11:38 AM CDT -----  Contact: self  Pt called and stated he need to be seen sooner then his Dec appt. I did advise pt her first opening is not till Jan. Pt can be reached at [phone]

## 2022-11-04 NOTE — TELEPHONE ENCOUNTER
Informed patient that we do not have any sooner appointments. Nevaeh scheduled changed and the soonest is December. Patient verbalized understanding

## 2022-11-11 ENCOUNTER — PATIENT OUTREACH (OUTPATIENT)
Dept: ADMINISTRATIVE | Facility: HOSPITAL | Age: 57
End: 2022-11-11
Payer: MEDICAID

## 2022-11-15 ENCOUNTER — TELEPHONE (OUTPATIENT)
Dept: FAMILY MEDICINE | Facility: CLINIC | Age: 57
End: 2022-11-15
Payer: MEDICAID

## 2022-11-15 ENCOUNTER — PATIENT MESSAGE (OUTPATIENT)
Dept: FAMILY MEDICINE | Facility: CLINIC | Age: 57
End: 2022-11-15
Payer: MEDICAID

## 2022-11-15 NOTE — TELEPHONE ENCOUNTER
----- Message from Berna Falcon sent at 11/15/2022 12:26 PM CST -----  Contact: Patient  Patient need to come in before 11/29 if possible    Please call patient to work him in   #  [phone]

## 2022-12-01 ENCOUNTER — OFFICE VISIT (OUTPATIENT)
Dept: PULMONOLOGY | Facility: CLINIC | Age: 57
End: 2022-12-01
Payer: MEDICAID

## 2022-12-01 VITALS
DIASTOLIC BLOOD PRESSURE: 74 MMHG | OXYGEN SATURATION: 91 % | HEART RATE: 81 BPM | WEIGHT: 267.81 LBS | BODY MASS INDEX: 33.3 KG/M2 | HEIGHT: 75 IN | SYSTOLIC BLOOD PRESSURE: 106 MMHG | RESPIRATION RATE: 17 BRPM

## 2022-12-01 DIAGNOSIS — G47.33 OSA (OBSTRUCTIVE SLEEP APNEA): Primary | ICD-10-CM

## 2022-12-01 DIAGNOSIS — J45.909 ASTHMA, UNSPECIFIED ASTHMA SEVERITY, UNSPECIFIED WHETHER COMPLICATED, UNSPECIFIED WHETHER PERSISTENT: ICD-10-CM

## 2022-12-01 PROCEDURE — 1159F MED LIST DOCD IN RCRD: CPT | Mod: CPTII,S$GLB,,

## 2022-12-01 PROCEDURE — 99215 OFFICE O/P EST HI 40 MIN: CPT | Mod: S$GLB,,,

## 2022-12-01 PROCEDURE — 3008F PR BODY MASS INDEX (BMI) DOCUMENTED: ICD-10-PCS | Mod: CPTII,S$GLB,,

## 2022-12-01 PROCEDURE — 3074F PR MOST RECENT SYSTOLIC BLOOD PRESSURE < 130 MM HG: ICD-10-PCS | Mod: CPTII,S$GLB,,

## 2022-12-01 PROCEDURE — 3074F SYST BP LT 130 MM HG: CPT | Mod: CPTII,S$GLB,,

## 2022-12-01 PROCEDURE — 1159F PR MEDICATION LIST DOCUMENTED IN MEDICAL RECORD: ICD-10-PCS | Mod: CPTII,S$GLB,,

## 2022-12-01 PROCEDURE — 3078F PR MOST RECENT DIASTOLIC BLOOD PRESSURE < 80 MM HG: ICD-10-PCS | Mod: CPTII,S$GLB,,

## 2022-12-01 PROCEDURE — 3078F DIAST BP <80 MM HG: CPT | Mod: CPTII,S$GLB,,

## 2022-12-01 PROCEDURE — 99215 PR OFFICE/OUTPT VISIT, EST, LEVL V, 40-54 MIN: ICD-10-PCS | Mod: S$GLB,,,

## 2022-12-01 PROCEDURE — 3008F BODY MASS INDEX DOCD: CPT | Mod: CPTII,S$GLB,,

## 2022-12-01 RX ORDER — ALBUTEROL SULFATE 90 UG/1
2 AEROSOL, METERED RESPIRATORY (INHALATION) EVERY 6 HOURS
Qty: 8.5 G | Refills: 1 | Status: SHIPPED | OUTPATIENT
Start: 2022-12-01 | End: 2023-07-18 | Stop reason: SDUPTHER

## 2022-12-01 RX ORDER — BUDESONIDE AND FORMOTEROL FUMARATE DIHYDRATE 160; 4.5 UG/1; UG/1
2 AEROSOL RESPIRATORY (INHALATION) EVERY 12 HOURS
Qty: 0.09 G | Refills: 5 | Status: SHIPPED | OUTPATIENT
Start: 2022-12-01 | End: 2023-07-18 | Stop reason: SDUPTHER

## 2022-12-01 NOTE — ASSESSMENT & PLAN NOTE
Refill Symbicort, resend order for Spiriva. We discussed the inmportance of using these medications regularly.   Last CBC did show peripheral eosinophilia and IgE was elevated, but at this point I do not think he is a candidate for biologics because of compliance with maintenance therapy.

## 2022-12-01 NOTE — ASSESSMENT & PLAN NOTE
Uncontrolled JUAN FRANCISCO with AHI of 14 and ESS of 13.   Needs nocturnal pulse ox to assess for nocturnal hypoxia. Mean pressures on CR was 13 cmH20

## 2022-12-01 NOTE — PROGRESS NOTES
"Subjective:    Patient Identification:  Patient ID: Kevin Singh is a 57 y.o. male.    Referring Provider:  No ref. provider found     Chief Complaint:  Asthma      History of Present Illness:    Kevin Singh is a 57 y.o. male who presents for asthma and JUAN FRANCISCO follow up. He is on Symbicort, also takes singulair. He is supposed to be on Spiriva also, but he does not remember if he has the inhaler, so he has not been using it. He is still fairly symptomatic having SOBOE. He does not really use his rescue inhaler, but he says he does use his Symbicort most of the time when he can remember.     In terms of JUAN FRANCISCO, his ESS is still significantly elevated at 13 with SDSA score of 6. His compliance report shows 100% compliance, but his AHI on the compliance report is 14, which is somewhat of an improvement from his AHI on his sleep study which was 20. His SpO2 here in clinic on RA was only 91%. He says that his "oxygen is always low." A 6MWT was done in clinic today which showed no exertional hypoxia.    Review of Systems:  Review of Systems   Constitutional:  Negative for fever, chills, weight loss, weight gain, activity change, appetite change, fatigue, night sweats and weakness.   HENT:  Negative for nosebleeds, postnasal drip, rhinorrhea, sinus pressure, sore throat, trouble swallowing, voice change, congestion, ear pain and hearing loss.    Eyes:  Negative for redness and itching.   Respiratory:  Positive for apnea, snoring, dyspnea on extertion and somnolence. Negative for cough, hemoptysis, sputum production, choking, chest tightness, shortness of breath, wheezing, orthopnea, previous hospitialization due to pulmonary problems, asthma nighttime symptoms, pleurisy, use of rescue inhaler and Paroxysmal Nocturnal Dyspnea.    Cardiovascular:  Negative for chest pain, palpitations and leg swelling.   Genitourinary:  Negative for difficulty urinating and hematuria.   Endocrine:  Negative for polydipsia, polyphagia, cold " intolerance, heat intolerance and polyuria.    Musculoskeletal:  Negative for arthralgias, back pain, gait problem, joint swelling and myalgias.   Skin:  Negative for rash.   Gastrointestinal:  Negative for nausea, vomiting, abdominal pain, abdominal distention and acid reflux.   Neurological:  Negative for dizziness, syncope, weakness, light-headedness and headaches.   Hematological:  Negative for adenopathy. Does not bruise/bleed easily and no excessive bruising.   Psychiatric/Behavioral:  Negative for confusion and sleep disturbance. The patient is not nervous/anxious.      Allergies: Review of patient's allergies indicates:  No Known Allergies    Medications:      Past Medical History:      Past Medical History:   Diagnosis Date    Asthma     Enlarged prostate        Family History:      Family History   Problem Relation Age of Onset    Multiple sclerosis Mother     Diabetes Father     Hypertension Father     Prostatitis Father         Social History:      Past Surgical History:   Procedure Laterality Date    HERNIA REPAIR      SINUS SURGERY      polups        Physical Exam:  Vitals:    12/01/22 1219   BP: 106/74   Pulse: 81   Resp: 17     Physical Exam   Constitutional: He is oriented to person, place, and time. He appears not cachectic. No distress.   HENT:   Head: Normocephalic.   Right Ear: External ear normal.   Left Ear: External ear normal.   Nose: Nose normal. No mucosal edema. No polyps.   Mouth/Throat: Oropharynx is clear and moist. Normal dentition. No oropharyngeal exudate. Mallampati Score: III.   Neck: No JVD present. No tracheal deviation present. No thyromegaly present.   Cardiovascular: Normal rate, regular rhythm, normal heart sounds and intact distal pulses. Exam reveals no gallop and no friction rub.   No murmur heard.  Pulmonary/Chest: Normal expansion, symmetric chest wall expansion, effort normal and breath sounds normal. No stridor. No respiratory distress. He has no decreased breath  sounds. He has no wheezes. He has no rhonchi. He has no rales. Chest wall is not dull to percussion. He exhibits no tenderness. Negative for egophony. Negative for tactile fremitus.   Abdominal: Soft. Bowel sounds are normal. He exhibits no distension and no mass. There is no hepatosplenomegaly. There is no abdominal tenderness. There is no rebound and no guarding. No hernia.   Musculoskeletal:         General: No tenderness, deformity or edema. Normal range of motion.      Cervical back: Normal range of motion and neck supple.   Lymphadenopathy: No supraclavicular adenopathy is present.     He has no cervical adenopathy.     He has no axillary adenopathy.   Neurological: He is alert and oriented to person, place, and time. He has normal reflexes. He displays normal reflexes. No cranial nerve deficit. He exhibits normal muscle tone.   Skin: Skin is warm and dry. No rash noted. He is not diaphoretic. No cyanosis or erythema. No pallor. Nails show no clubbing.   Psychiatric: He has a normal mood and affect. His behavior is normal. Judgment and thought content normal.         No results found for: PREFEV1, TCX2HFBEUG, PREFVC, FVCPREREF, ZSSQSM3QQP, EUX3KSFLAGV, RIXC7PMB, RHVN2WTA, PREDLCO, DLCOSBPRRF, DLCOADJPRE, DLCOCSBRPRRF, POSTFEV1, POSTFVC, FGDSNYI9QZL   X-Ray Chest PA And Lateral                                RADIOLOGY REPORT        PT NAME: NIRAJ KHAN      [facility]     : 1965 M 56             [address]    ACCT: [id number]                                              [facility] Rec #: [id number]                                        [id number]    Patient Location: [facility]/             Procedure: CHEST 2 VIEWS    REQUISITION #: [id number]      REPORT #: [id number]           DATE OF EXAM: 22    TIME OF EXAM:            PROCEDURE: CHEST 2 VIEWS    CLINICAL DATA:    Shortness of breath        TECHNIQUE:    Views:  PA and lateral views of the chest.    Limitations: The  images are technically satisfactory.        COMPARISON:    No prior relevant studies are available.        FINDINGS:    Cardiovascular:    1. Chronic atherosclerotic changes noted in the aorta.        Lungs and pleura: Normal.        Mediastinal and hilar structures: Normal.        Osseous structures: Normal.        Additional findings: None seen.        IMPRESSION:    1.  Negative examination.            This document was created using a voice recognition transcribing system.   Incorrect words or phrases may have been missed during proof reading. Please   interpret accordingly or contact the radiologist for clarification if   necessary.        DICTATING PHYSICIAN:  GRISELDA FREITAS MD                   Date Dictated: 03/24/22 1118        Signed By:  GRISELDA FREITAS MD <Electronically signed by GRISELDA FREITAS MD in OV>    Date Signed:  03/24/22 1118     CC: KALANI AMBROCIO MD ; KALANI AMBROCIO MD      ADMITTING PHYSICIAN:                                                                                                    ORDERING PHY: KALANI AMBROCIO MD                                                                                                                                                      ATTENDING PHY: KALANI AMBROCIO MD    Patient Status:  REG CLI    Admit Service Date: 03/24/22                Accessory Clinical Data:  Chest x-ray:    CT scan:     PFTs:     6MWT:     TTE:    Lab data:    All radiographic imaging of the chest, PFT tracings/data, and 6MWT data have been independently reviewed and interpreted unless otherwise specified.     Assessment and Plan:      Problem List Items Addressed This Visit          Pulmonary    Asthma    Current Assessment & Plan     Refill Symbicort, resend order for Spiriva. We discussed the inmportance of using these medications regularly.   Last CBC did show peripheral eosinophilia and IgE was elevated, but at this point I do not think he is a candidate for  biologics because of compliance with maintenance therapy.             Other    JUAN FRANCISCO (obstructive sleep apnea) - Primary    Current Assessment & Plan     Uncontrolled JUAN FRANCISCO with AHI of 14 and ESS of 13.   Needs nocturnal pulse ox to assess for nocturnal hypoxia. Mean pressures on CR was 13 cmH20         Relevant Orders    Nursing communication      Orders Placed This Encounter   Procedures    Nursing communication     Please arrange for nocturnal pulse oximetry monitoring        High complexity case.  60 min were spent in reviewing the important data including imaging studies on a different EMR, laboratory data, notes from other consultants and reviewing care with other providers with more than 50% of the time spent face-to-face on counseling, explaining the disease process, progression, importance of compliance with prescribed medications and the importance of follow-up.      Follow up for JUAN FRANCISCO and asthma with Dr. Jackson.

## 2022-12-05 ENCOUNTER — OFFICE VISIT (OUTPATIENT)
Dept: FAMILY MEDICINE | Facility: CLINIC | Age: 57
End: 2022-12-05
Payer: MEDICAID

## 2022-12-05 ENCOUNTER — TELEPHONE (OUTPATIENT)
Dept: FAMILY MEDICINE | Facility: CLINIC | Age: 57
End: 2022-12-05

## 2022-12-05 VITALS
TEMPERATURE: 98 F | HEIGHT: 75 IN | DIASTOLIC BLOOD PRESSURE: 71 MMHG | OXYGEN SATURATION: 92 % | WEIGHT: 271 LBS | SYSTOLIC BLOOD PRESSURE: 102 MMHG | BODY MASS INDEX: 33.69 KG/M2 | HEART RATE: 67 BPM

## 2022-12-05 DIAGNOSIS — G47.33 OSA (OBSTRUCTIVE SLEEP APNEA): ICD-10-CM

## 2022-12-05 DIAGNOSIS — Z71.1 CONCERN ABOUT SKIN CANCER WITHOUT DIAGNOSIS: ICD-10-CM

## 2022-12-05 DIAGNOSIS — E66.9 OBESITY (BMI 30.0-34.9): ICD-10-CM

## 2022-12-05 DIAGNOSIS — R53.83 FATIGUE, UNSPECIFIED TYPE: ICD-10-CM

## 2022-12-05 DIAGNOSIS — E78.5 HYPERLIPIDEMIA, UNSPECIFIED HYPERLIPIDEMIA TYPE: ICD-10-CM

## 2022-12-05 DIAGNOSIS — R09.02 HYPOXEMIA: ICD-10-CM

## 2022-12-05 DIAGNOSIS — J45.909 ASTHMA, UNSPECIFIED ASTHMA SEVERITY, UNSPECIFIED WHETHER COMPLICATED, UNSPECIFIED WHETHER PERSISTENT: Primary | ICD-10-CM

## 2022-12-05 DIAGNOSIS — Z12.5 PROSTATE CANCER SCREENING: ICD-10-CM

## 2022-12-05 LAB
ABS NRBC COUNT: 0 X 10 3/UL (ref 0–0.01)
ABSOLUTE BASOPHIL: 0.06 X 10 3/UL (ref 0–0.22)
ABSOLUTE EOSINOPHIL: 0.99 X 10 3/UL (ref 0.04–0.54)
ABSOLUTE IMMATURE GRAN: 0.01 X 10 3/UL (ref 0–0.04)
ABSOLUTE LYMPHOCYTE: 2.63 X 10 3/UL (ref 0.86–4.75)
ABSOLUTE MONOCYTE: 0.78 X 10 3/UL (ref 0.22–1.08)
ALBUMIN SERPL-MCNC: 4.1 G/DL (ref 3.5–5.2)
ALP ISOS SERPL LEV INH-CCNC: 66 U/L (ref 40–130)
ALT (SGPT): 9 U/L (ref 0–41)
ANION GAP SERPL CALC-SCNC: 10 MMOL/L (ref 8–17)
AST SERPL-CCNC: 15 U/L (ref 0–40)
BASOPHILS NFR BLD: 0.9 % (ref 0.2–1.2)
BILIRUB CONJ+UNCONJ SERPL-MCNC: NORMAL MG/DL (ref 0.1–0.8)
BILIRUBIN DIRECT+TOT PNL SERPL-MCNC: <0.2 MG/DL (ref 0–0.3)
BILIRUBIN, TOTAL: 0.37 MG/DL (ref 0–1.2)
BUN/CREAT SERPL: 16.8 (ref 6–20)
CALCIUM SERPL-MCNC: 8.9 MG/DL (ref 8.6–10.2)
CARBON DIOXIDE, CO2: 26 MMOL/L (ref 22–29)
CHLORIDE: 107 MMOL/L (ref 98–107)
CHOLEST SERPL-MSCNC: 170 MG/DL (ref 100–200)
CREAT SERPL-MCNC: 0.87 MG/DL (ref 0.7–1.2)
EOSINOPHIL NFR BLD: 14.6 % (ref 0.7–7)
ESTIMATED AVERAGE GLUCOSE: 124 MG/DL
GFR ESTIMATION: 100.02
GLOBULIN: 2.6 G/DL (ref 1.5–4.5)
GLUCOSE: 99 MG/DL (ref 74–106)
HBA1C MFR BLD: 5.9 % (ref 4–6)
HCT VFR BLD AUTO: 44.9 % (ref 42–52)
HDLC SERPL-MCNC: 55 MG/DL
HGB BLD-MCNC: 14.6 G/DL (ref 14–18)
IMMATURE GRANULOCYTES: 0.1 % (ref 0–0.5)
LDL/HDL RATIO: 1.8 (ref 1–3)
LDLC SERPL CALC-MCNC: 98.8 MG/DL (ref 0–100)
LYMPHOCYTES NFR BLD: 38.7 % (ref 19.3–53.1)
MCH RBC QN AUTO: 29.3 PG (ref 27–32)
MCHC RBC AUTO-ENTMCNC: 32.5 G/DL (ref 32–36)
MCV RBC AUTO: 90 FL (ref 80–94)
MONOCYTES NFR BLD: 11.5 % (ref 4.7–12.5)
NEUTROPHILS # BLD AUTO: 2.32 X 10 3/UL (ref 2.15–7.56)
NEUTROPHILS NFR BLD: 34.2 % (ref 34–71.1)
NUCLEATED RED BLOOD CELLS: 0 /100 WBC (ref 0–0.2)
PLATELET # BLD AUTO: 255 X 10 3/UL (ref 135–400)
POTASSIUM: 4.2 MMOL/L (ref 3.5–5.1)
PROT SNV-MCNC: 6.7 G/DL (ref 6.4–8.3)
PSA, DIAGNOSTIC: 4.15 NG/ML (ref 0–4)
RBC # BLD AUTO: 4.99 X 10 6/UL (ref 4.7–6.1)
RDW-SD: 43.9 FL (ref 37–54)
SODIUM: 143 MMOL/L (ref 136–145)
TRIGL SERPL-MCNC: 81 MG/DL (ref 0–150)
TSH SERPL DL<=0.005 MIU/L-ACNC: 2.59 UIU/ML (ref 0.27–4.2)
UREA NITROGEN (BUN): 14.6 MG/DL (ref 6–20)
VITAMIN D (25OHD): 36.9 NG/ML
WBC # BLD: 6.79 X 10 3/UL (ref 4.3–10.8)

## 2022-12-05 PROCEDURE — 3008F PR BODY MASS INDEX (BMI) DOCUMENTED: ICD-10-PCS | Mod: CPTII,S$GLB,, | Performed by: INTERNAL MEDICINE

## 2022-12-05 PROCEDURE — 1160F PR REVIEW ALL MEDS BY PRESCRIBER/CLIN PHARMACIST DOCUMENTED: ICD-10-PCS | Mod: CPTII,S$GLB,, | Performed by: INTERNAL MEDICINE

## 2022-12-05 PROCEDURE — 99214 PR OFFICE/OUTPT VISIT, EST, LEVL IV, 30-39 MIN: ICD-10-PCS | Mod: S$GLB,,, | Performed by: INTERNAL MEDICINE

## 2022-12-05 PROCEDURE — 1159F PR MEDICATION LIST DOCUMENTED IN MEDICAL RECORD: ICD-10-PCS | Mod: CPTII,S$GLB,, | Performed by: INTERNAL MEDICINE

## 2022-12-05 PROCEDURE — 3078F DIAST BP <80 MM HG: CPT | Mod: CPTII,S$GLB,, | Performed by: INTERNAL MEDICINE

## 2022-12-05 PROCEDURE — 3078F PR MOST RECENT DIASTOLIC BLOOD PRESSURE < 80 MM HG: ICD-10-PCS | Mod: CPTII,S$GLB,, | Performed by: INTERNAL MEDICINE

## 2022-12-05 PROCEDURE — 1160F RVW MEDS BY RX/DR IN RCRD: CPT | Mod: CPTII,S$GLB,, | Performed by: INTERNAL MEDICINE

## 2022-12-05 PROCEDURE — 1159F MED LIST DOCD IN RCRD: CPT | Mod: CPTII,S$GLB,, | Performed by: INTERNAL MEDICINE

## 2022-12-05 PROCEDURE — 3044F PR MOST RECENT HEMOGLOBIN A1C LEVEL <7.0%: ICD-10-PCS | Mod: CPTII,S$GLB,, | Performed by: INTERNAL MEDICINE

## 2022-12-05 PROCEDURE — 3008F BODY MASS INDEX DOCD: CPT | Mod: CPTII,S$GLB,, | Performed by: INTERNAL MEDICINE

## 2022-12-05 PROCEDURE — 99214 OFFICE O/P EST MOD 30 MIN: CPT | Mod: S$GLB,,, | Performed by: INTERNAL MEDICINE

## 2022-12-05 PROCEDURE — 3044F HG A1C LEVEL LT 7.0%: CPT | Mod: CPTII,S$GLB,, | Performed by: INTERNAL MEDICINE

## 2022-12-05 PROCEDURE — 3074F SYST BP LT 130 MM HG: CPT | Mod: CPTII,S$GLB,, | Performed by: INTERNAL MEDICINE

## 2022-12-05 PROCEDURE — 3074F PR MOST RECENT SYSTOLIC BLOOD PRESSURE < 130 MM HG: ICD-10-PCS | Mod: CPTII,S$GLB,, | Performed by: INTERNAL MEDICINE

## 2022-12-05 RX ORDER — DUTASTERIDE 0.5 MG/1
0.5 CAPSULE, LIQUID FILLED ORAL
COMMUNITY
Start: 2022-11-09

## 2022-12-05 RX ORDER — MUPIROCIN 20 MG/G
2 OINTMENT TOPICAL 3 TIMES DAILY
COMMUNITY
Start: 2022-11-09

## 2022-12-05 NOTE — PROGRESS NOTES
"Subjective:       Patient ID: Kevin Singh is a 57 y.o. male.    Chief Complaint: Follow-up      HPI: Kevin comes in today for follow-up.  He seeing pulmonary for a low oxygen saturation, and they are working to set that up.  He does have a strong family history of heart disease and although he denies any chest pains I would like to check an echocardiogram.  He also sees Dr. Hadley for BPH and nocturia.  He said he did have his colon screen 2 few years ago by a female gastroenterologist.  Says they removed a few polyps.  He is being tested for sleep apnea as well.  It looks like he has a CT of his chest as well as pulmonary function studies ordered.  He says they are also going to be doing us home evaluation for sleep apnea.  I have discussed with him the importance of getting this lesion biopsied on his back.  I told him I believe that it represents a skin cancer.  We did try to set him up with Dermatology in the past but he said he ended up not hearing from them and he ended up traveling with work and so nothing ever got done.  I discussed with him the importance of getting this followed up on soon.  He does not smoke.       Past Medical History:   Past Medical History:   Diagnosis Date    Asthma     Enlarged prostate        Past Surgical Historical:   Past Surgical History:   Procedure Laterality Date    HERNIA REPAIR      SINUS SURGERY      polups         Vitals: /71 (BP Location: Right arm, Patient Position: Sitting, BP Method: Large (Automatic))   Pulse 67   Temp 98.1 °F (36.7 °C) (Temporal)   Ht 6' 3" (1.905 m)   Wt 122.9 kg (271 lb)   SpO2 (!) 92%   BMI 33.87 kg/m²      Medications:   Medication List with Changes/Refills   Current Medications    ALBUTEROL (PROVENTIL/VENTOLIN HFA) 90 MCG/ACTUATION INHALER    Inhale 2 puffs into the lungs every 6 (six) hours. Rescue    ALFUZOSIN (UROXATRAL) 10 MG TB24    Take 1 tablet by mouth once daily.    BUDESONIDE-FORMOTEROL 160-4.5 MCG (SYMBICORT) 160-4.5 " MCG/ACTUATION HFAA    Inhale 2 puffs into the lungs every 12 (twelve) hours. Controller    DUTASTERIDE (AVODART) 0.5 MG CAPSULE    Take 0.5 mg by mouth.    MONTELUKAST (SINGULAIR) 10 MG TABLET    TAKE 1 TABLET(10 MG) BY MOUTH EVERY EVENING    MUPIROCIN (BACTROBAN) 2 % OINTMENT    Apply 2 g topically 3 (three) times daily.    PREDNISONE (DELTASONE) 10 MG TABLET    Take 1 tablet twice daily x 5 days, then take 1 tablet daily x 5 days for acute asthma attack        Past Social History:   Social History     Socioeconomic History    Marital status:     Number of children: 4   Occupational History    Occupation: safety tech    Tobacco Use    Smoking status: Never     Passive exposure: Past    Smokeless tobacco: Never   Substance and Sexual Activity    Alcohol use: Never    Drug use: Not Currently    Sexual activity: Yes       Allergies: Review of patient's allergies indicates:  No Known Allergies     Family History:   Family History   Problem Relation Age of Onset    Multiple sclerosis Mother     Diabetes Father     Hypertension Father     Prostatitis Father         Review of Systems:  Review of Systems   Respiratory:  Negative for shortness of breath and wheezing.    Cardiovascular:  Negative for chest pain and palpitations.   Gastrointestinal:  Negative for abdominal pain, change in bowel habit and change in bowel habit.   Neurological:  Negative for dizziness and syncope.      Physical Exam:  Physical Exam  Vitals reviewed.   Constitutional:       Appearance: Normal appearance.   Cardiovascular:      Rate and Rhythm: Normal rate and regular rhythm.   Pulmonary:      Effort: Pulmonary effort is normal.      Breath sounds: Normal breath sounds.   Abdominal:      General: Abdomen is flat.      Palpations: Abdomen is soft.   Skin:     General: Skin is warm and dry.      Comments: No Induration.    Neurological:      General: No focal deficit present.      Mental Status: He is alert and oriented to person, place,  and time.   Psychiatric:         Mood and Affect: Mood normal.      Comments: Oriented.         Labs:  Patient Outreach on 11/11/2022   Component Date Value Ref Range Status    CRC Recommendation External 08/24/2018 Repeat colonoscopy in 3 years   Final        Assessment/Plan:       Problem List Items Addressed This Visit          Pulmonary    Asthma - Primary       Cardiac/Vascular    Hyperlipidemia    Relevant Orders    Lipid Panel       Other    JUAN FRANCISCO (obstructive sleep apnea)     Other Visit Diagnoses       Prostate cancer screening        Relevant Orders    Prostate Specific Antigen, Diagnostic    Fatigue, unspecified type        Relevant Orders    CBC Auto Differential    TSH    Obesity (BMI 30.0-34.9)        Relevant Orders    Basic Metabolic Panel    Hemoglobin A1C    Hepatic Function Panel    Vitamin D    Concern about skin cancer without diagnosis        Relevant Orders    Ambulatory referral/consult to Dermatology    Hypoxemia        Relevant Orders    Echo                    Follow up in about 2 months (around 2/5/2023).     Helio Quarles

## 2022-12-05 NOTE — TELEPHONE ENCOUNTER
You, Dr. Quarles is concerned about Mr. Singh SpO2, it is still reading pretty low. He did review the most recent office visit with Leydi Mcbride, SUSHANT at which she stated she would be having patient do a CT and PFT. Dr. Quarles wants to see where you guys are with this as he feels it is important to have patient get done asap.

## 2022-12-12 ENCOUNTER — PATIENT MESSAGE (OUTPATIENT)
Dept: FAMILY MEDICINE | Facility: CLINIC | Age: 57
End: 2022-12-12

## 2022-12-12 ENCOUNTER — CLINICAL SUPPORT (OUTPATIENT)
Dept: PULMONOLOGY | Facility: CLINIC | Age: 57
End: 2022-12-12
Payer: MEDICAID

## 2022-12-12 ENCOUNTER — TELEPHONE (OUTPATIENT)
Dept: FAMILY MEDICINE | Facility: CLINIC | Age: 57
End: 2022-12-12

## 2022-12-12 VITALS — WEIGHT: 267.88 LBS | BODY MASS INDEX: 33.31 KG/M2 | HEIGHT: 75 IN

## 2022-12-12 DIAGNOSIS — J45.909 ASTHMA, UNSPECIFIED ASTHMA SEVERITY, UNSPECIFIED WHETHER COMPLICATED, UNSPECIFIED WHETHER PERSISTENT: Primary | ICD-10-CM

## 2022-12-12 DIAGNOSIS — J45.909 ASTHMA, UNSPECIFIED ASTHMA SEVERITY, UNSPECIFIED WHETHER COMPLICATED, UNSPECIFIED WHETHER PERSISTENT: ICD-10-CM

## 2022-12-12 PROCEDURE — 94618 PULMONARY STRESS TESTING: ICD-10-PCS | Mod: S$GLB,,, | Performed by: INTERNAL MEDICINE

## 2022-12-12 PROCEDURE — 94618 PULMONARY STRESS TESTING: CPT | Mod: S$GLB,,, | Performed by: INTERNAL MEDICINE

## 2022-12-12 NOTE — PROCEDURES
"[facility] ([facility]) - Pulmonary Function  Six Minute Walk     SUMMARY     Ordering Provider: Dr. Jackson   Interpreting Provider: Dr. Jackson  Performing nurse/tech/RT: Aileen RRT  Diagnosis:  (Asthma)  Height: 6' 3" (190.5 cm)  Weight: 121.5 kg (267 lb 13.7 oz)  BMI (Calculated): 33.5   Patient Race:             Phase Oxygen Assessment Supplemental O2 Heart   Rate Blood Pressure Nataliya Dyspnea Scale Rating   Resting 93 % Room Air 84 bpm 106/74 0   Exercise        Minute        1 93 % Room Air 93 bpm     2 95 % Room Air 96 bpm     3 94 % Room Air 94 bpm     4 96 % Room Air 94 bpm     5 96 % Room Air 93 bpm     6  95 % Room Air 94 bpm 107/75 4   Recovery        Minute        1 97 % Room Air 85 bpm     2 95 % Room Air 78 bpm     3 94 % Room Air 76 bpm     4 94 % Room Air 78 bpm 122/80 2     Six Minute Walk Summary  6MWT Status: completed without stopping  Patient Reported: Dyspnea     Interpretation:  Did the patient stop or pause?: No                                         Total Time Walked (Calculated): 360 seconds  Final Partial Lap Distance (feet): 100 feet  Total Distance Meters (Calculated): 396.24 meters  Predicted Distance Meters (Calculated): 628.09 meters  Percentage of Predicted (Calculated): 63.09  Peak VO2 (Calculated): 15.87  Mets: 4.53  Has The Patient Had a Previous Six Minute Walk Test?: No       Previous 6MWT Results  Has The Patient Had a Previous Six Minute Walk Test?: No    Physician Interpretation:  No exertional hypoxia.  Patient walked 63% of predicted distance (396 meters) in 6 minutes.  NATALIYA increased from 1 to 4.  HR increased from 84 bpm to 94 bpm.  /74 to 107/75 suggestive of cardiac reserve.  -Marielle Jackson MD    "

## 2022-12-12 NOTE — TELEPHONE ENCOUNTER
----- Message from Helio Quarles MD sent at 12/5/2022  3:03 PM CST -----  His lab work looks okay except his PSA is up a little bit.  I would like to send a copy of this lab work to Dr. Hadley who is I think the urologist that he sees.

## 2022-12-29 ENCOUNTER — OFFICE VISIT (OUTPATIENT)
Dept: PULMONOLOGY | Facility: CLINIC | Age: 57
End: 2022-12-29
Payer: MEDICAID

## 2022-12-29 VITALS
WEIGHT: 270 LBS | SYSTOLIC BLOOD PRESSURE: 105 MMHG | DIASTOLIC BLOOD PRESSURE: 73 MMHG | BODY MASS INDEX: 33.57 KG/M2 | HEART RATE: 92 BPM | OXYGEN SATURATION: 91 % | RESPIRATION RATE: 16 BRPM | HEIGHT: 75 IN

## 2022-12-29 DIAGNOSIS — J82.83 EOSINOPHILIC ASTHMA: ICD-10-CM

## 2022-12-29 DIAGNOSIS — G47.33 OSA (OBSTRUCTIVE SLEEP APNEA): ICD-10-CM

## 2022-12-29 DIAGNOSIS — J45.909 SEVERE ASTHMA WITHOUT COMPLICATION, UNSPECIFIED WHETHER PERSISTENT: Primary | ICD-10-CM

## 2022-12-29 DIAGNOSIS — G47.34 NOCTURNAL HYPOXEMIA: ICD-10-CM

## 2022-12-29 PROCEDURE — 3008F PR BODY MASS INDEX (BMI) DOCUMENTED: ICD-10-PCS | Mod: CPTII,S$GLB,, | Performed by: INTERNAL MEDICINE

## 2022-12-29 PROCEDURE — 99215 OFFICE O/P EST HI 40 MIN: CPT | Mod: S$GLB,,, | Performed by: INTERNAL MEDICINE

## 2022-12-29 PROCEDURE — 3008F BODY MASS INDEX DOCD: CPT | Mod: CPTII,S$GLB,, | Performed by: INTERNAL MEDICINE

## 2022-12-29 PROCEDURE — 3044F HG A1C LEVEL LT 7.0%: CPT | Mod: CPTII,S$GLB,, | Performed by: INTERNAL MEDICINE

## 2022-12-29 PROCEDURE — 99215 PR OFFICE/OUTPT VISIT, EST, LEVL V, 40-54 MIN: ICD-10-PCS | Mod: S$GLB,,, | Performed by: INTERNAL MEDICINE

## 2022-12-29 PROCEDURE — 1159F MED LIST DOCD IN RCRD: CPT | Mod: CPTII,S$GLB,, | Performed by: INTERNAL MEDICINE

## 2022-12-29 PROCEDURE — 1159F PR MEDICATION LIST DOCUMENTED IN MEDICAL RECORD: ICD-10-PCS | Mod: CPTII,S$GLB,, | Performed by: INTERNAL MEDICINE

## 2022-12-29 PROCEDURE — 3044F PR MOST RECENT HEMOGLOBIN A1C LEVEL <7.0%: ICD-10-PCS | Mod: CPTII,S$GLB,, | Performed by: INTERNAL MEDICINE

## 2022-12-29 PROCEDURE — 1160F PR REVIEW ALL MEDS BY PRESCRIBER/CLIN PHARMACIST DOCUMENTED: ICD-10-PCS | Mod: CPTII,S$GLB,, | Performed by: INTERNAL MEDICINE

## 2022-12-29 PROCEDURE — 3074F PR MOST RECENT SYSTOLIC BLOOD PRESSURE < 130 MM HG: ICD-10-PCS | Mod: CPTII,S$GLB,, | Performed by: INTERNAL MEDICINE

## 2022-12-29 PROCEDURE — 3078F DIAST BP <80 MM HG: CPT | Mod: CPTII,S$GLB,, | Performed by: INTERNAL MEDICINE

## 2022-12-29 PROCEDURE — 1160F RVW MEDS BY RX/DR IN RCRD: CPT | Mod: CPTII,S$GLB,, | Performed by: INTERNAL MEDICINE

## 2022-12-29 PROCEDURE — 3074F SYST BP LT 130 MM HG: CPT | Mod: CPTII,S$GLB,, | Performed by: INTERNAL MEDICINE

## 2022-12-29 PROCEDURE — 3078F PR MOST RECENT DIASTOLIC BLOOD PRESSURE < 80 MM HG: ICD-10-PCS | Mod: CPTII,S$GLB,, | Performed by: INTERNAL MEDICINE

## 2022-12-29 NOTE — PROGRESS NOTES
Subjective:    Patient Identification:   Patient ID: Kevin Singh is a 57 y.o. male.    Referring Provider:  Established patient    Chief Complaint:  JUAN FRANCISCO (obstructive sleep apnea)      History of Present Illness:    Kevin Singh is a 57 y.o. male who presents for the management of his obstructive sleep apnea, chronic hypoxemia at nighttime as well as eosinophilic asthma.    Patient does meet criteria for biologics, however, we have had difficulty in documenting his compliance.  He is using Symbicort 2 puffs twice a day.  He is not using Spiriva.  He mentions that he received couple of medications but he did not even open them because he thought they were the same as he was using already.  He was prescribed with supplemental oxygen at nighttime to bleed into his CPAP.  He is mentioning that he is not using his CPAP machine every night.  I have requested compliance report but is not available at the time of dictation.  Mentions that he bought a pulse oximeter from local pharmacy which sometimes reads his oxygen saturation from 88-91%.  At nighttime he is oxygen is over 94% with supplemental oxygen.  He does get short of breath with little activities.  He does get short of breath on climbing stairs.  Does use over-the-counter Nasonex periodically.  He has history of polyps removed from his nostril.  Medication list shows Singulair but he does not recall using this medicine????.  Last asthma flare was several months ago treated with antibiotics and steroids.  He complains of daytime fatigue.  His primary care provider has recently ordered an echocardiogram to workup for hypoxia.  His 6 minute walk test did not show exertional hypoxia.    Review of Systems:  Review of Systems   Constitutional:  Positive for fatigue. Negative for fever, chills, weight loss, weight gain, activity change, appetite change, night sweats and weakness.   HENT:  Negative for nosebleeds, postnasal drip, rhinorrhea, sinus pressure, sore throat,  trouble swallowing, voice change, congestion, ear pain and hearing loss.    Eyes:  Negative for redness and itching.   Respiratory:  Positive for dyspnea on extertion. Negative for cough, hemoptysis, sputum production, choking, chest tightness, shortness of breath, wheezing, orthopnea, previous hospitialization due to pulmonary problems, asthma nighttime symptoms, pleurisy, use of rescue inhaler and Paroxysmal Nocturnal Dyspnea.    Cardiovascular:  Negative for chest pain, palpitations and leg swelling.   Genitourinary:  Negative for difficulty urinating and hematuria.   Endocrine:  Negative for polydipsia, polyphagia, cold intolerance, heat intolerance and polyuria.    Musculoskeletal:  Negative for arthralgias, back pain, gait problem, joint swelling and myalgias.   Skin:  Negative for rash.   Gastrointestinal:  Negative for nausea, vomiting, abdominal pain, abdominal distention and acid reflux.   Neurological:  Negative for dizziness, syncope, weakness, light-headedness and headaches.   Hematological:  Negative for adenopathy. Does not bruise/bleed easily and no excessive bruising.   Psychiatric/Behavioral:  Negative for confusion and sleep disturbance. The patient is not nervous/anxious.        Allergies: Review of patient's allergies indicates:  No Known Allergies    Medications:      Past Medical History:      Past Medical History:   Diagnosis Date    Asthma     Enlarged prostate        Family History:      Family History   Problem Relation Age of Onset    Multiple sclerosis Mother     Diabetes Father     Hypertension Father     Prostatitis Father         Social History:      Past Surgical History:   Procedure Laterality Date    HERNIA REPAIR      SINUS SURGERY      polups        Physical Exam:  Vitals:    12/29/22 1256   BP: 105/73   Pulse: 92   Resp: 16     Physical Exam   Constitutional: He is oriented to person, place, and time. He appears not cachectic. No distress. He is obese.   HENT:   Head:  Normocephalic.   Right Ear: External ear normal.   Left Ear: External ear normal.   Nose: Nose normal. No mucosal edema. No polyps.   Mouth/Throat: Oropharynx is clear and moist. Normal dentition. No oropharyngeal exudate.   Neck: No JVD present. No tracheal deviation present. No thyromegaly present.   Cardiovascular: Normal rate, regular rhythm, normal heart sounds and intact distal pulses. Exam reveals no gallop and no friction rub.   No murmur heard.  Pulmonary/Chest: Normal expansion, symmetric chest wall expansion and effort normal. No stridor. No respiratory distress. He has no decreased breath sounds. He has no rhonchi. He has no rales. Chest wall is not dull to percussion. He exhibits no tenderness.   Mild scattered expiratory wheezing Negative for egophony. Negative for tactile fremitus.   Abdominal: Soft. Bowel sounds are normal. He exhibits no distension and no mass. There is no hepatosplenomegaly. There is no abdominal tenderness. There is no rebound and no guarding. No hernia.   Musculoskeletal:         General: No tenderness, deformity or edema. Normal range of motion.      Cervical back: Normal range of motion and neck supple.   Lymphadenopathy: No supraclavicular adenopathy is present.     He has no cervical adenopathy.     He has no axillary adenopathy.   Neurological: He is alert and oriented to person, place, and time. He has normal reflexes. He displays normal reflexes. No cranial nerve deficit. He exhibits normal muscle tone.   Skin: Skin is warm and dry. No rash noted. He is not diaphoretic. No cyanosis or erythema. No pallor. Nails show no clubbing.   Psychiatric: He has a normal mood and affect. His behavior is normal. Judgment and thought content normal.                   Accessory Clinical Data:  Chest x-ray:    CT scan:     PFTs:  Combined moderate obstruction and restriction with normal diffusion capacity no bronchodilator response was seen.  This was a little odd PFT result.    6MWT:   Last 6 minute walk test from 12/12/2022 with 63% distance which is about 396 m traveled in 6 minutes with a peak VO2 of 15.87.  Meds:  4.53, Nataliya score from 0 to 4 and blood pressure from 106/74 to 107/75.  Heart rate only changing 84-94 beats per minute.    TTE:  None available    Lab data:    All radiographic imaging of the chest, PFT tracings/data, and 6MWT data have been independently reviewed and interpreted unless otherwise specified.     Assessment and Plan:        Problem List Items Addressed This Visit          Pulmonary    Asthma - Primary    Relevant Medications    tiotropium bromide (SPIRIVA RESPIMAT) 2.5 mcg/actuation inhaler    Other Relevant Orders    Complete PFT with bronchodilator       Other    JUAN FRANCISCO (obstructive sleep apnea)     Other Visit Diagnoses       Eosinophilic asthma        Relevant Orders    Complete PFT with bronchodilator    Nocturnal hypoxemia               Re prescribed Spiriva.  Discussed at length with the patient that his symptoms of shortness of breath are related to uncontrolled underlying asthma with eosinophilic phenotype.  He has significant eosinophilia and history of nasal polyps and would benefit from asthma biologic agents.  However, to get him approved for this therapy we will need to document compliance with the inhalers we are prescribing.  He did not show any exertional hypoxia on his latest 6 minute walk test just a few days ago.  An echocardiogram was ordered and we will follow-up on the results.    I will repeat PFTs when he returns on next visit.      I am unable to obtain his compliance report at this point.  His ESS is 11.  Without showing good compliance we can not determine or say if he has failed PAP therapy.  His symptoms are more of fatigue rather excessive sleepiness.  I have advised him to bring his CPAP machine next time so I can troubleshoot his residual AHI, mean pressure is and leak etcetera.  He was advised to continue to use his CPAP every night with  supplemental oxygen.  Stressed that asthma and sleep apnea controls and triggers or into related.      More than 50% of 45 min time was spent face-to-face on counseling, in reviewing the imaging studies, reviewing notes from primary care provider, performing appropriate examination, counseling and educating the patient regarding the findings on labs, PFTs, chest x-rays, ordering medications and appropriate follow-up studies, documenting clinical information in the electronic medical record, care coordination as well as communicating with the primary referring physician.    Follow up in about 2 months (around 2/28/2023).     This note is dictated on M*Modal word recognition program.  There are word recognition mistakes that are occasionally missed on review.

## 2023-03-13 ENCOUNTER — TELEPHONE (OUTPATIENT)
Dept: PULMONOLOGY | Facility: CLINIC | Age: 58
End: 2023-03-13
Payer: MEDICAID

## 2023-03-13 DIAGNOSIS — J45.909 SEVERE ASTHMA WITHOUT COMPLICATION, UNSPECIFIED WHETHER PERSISTENT: Primary | ICD-10-CM

## 2023-03-13 NOTE — TELEPHONE ENCOUNTER
Called pt. No answer, left vm to call back. Also requested compliance report from [facility], they are faxing over now.

## 2023-03-17 ENCOUNTER — TELEPHONE (OUTPATIENT)
Dept: PULMONOLOGY | Facility: CLINIC | Age: 58
End: 2023-03-17
Payer: MEDICAID

## 2023-03-17 NOTE — TELEPHONE ENCOUNTER
3rd attempt calling pt to confirm appt, no answer. Left vm stating we are going to r/s his appt due to him not getting his echo done.

## 2023-03-17 NOTE — TELEPHONE ENCOUNTER
----- Message from Azra Ford sent at 3/17/2023  9:30 AM CDT -----  Contact: Patient  Patient called to consul with nurse or staff regarding a missed call. He wasn't sure if the call was just to confirm the appointment but states he will be at the appointment on 3/20. If needing to reach patient he can be contacted at [phone]. Thanks/MR

## 2023-03-20 ENCOUNTER — CLINICAL SUPPORT (OUTPATIENT)
Dept: PULMONOLOGY | Facility: CLINIC | Age: 58
End: 2023-03-20
Payer: MEDICAID

## 2023-03-20 ENCOUNTER — OFFICE VISIT (OUTPATIENT)
Dept: PULMONOLOGY | Facility: CLINIC | Age: 58
End: 2023-03-20
Payer: MEDICAID

## 2023-03-20 VITALS
SYSTOLIC BLOOD PRESSURE: 99 MMHG | OXYGEN SATURATION: 94 % | DIASTOLIC BLOOD PRESSURE: 70 MMHG | HEART RATE: 85 BPM | BODY MASS INDEX: 33.57 KG/M2 | RESPIRATION RATE: 17 BRPM | WEIGHT: 270 LBS | HEIGHT: 75 IN

## 2023-03-20 DIAGNOSIS — G47.33 OBSTRUCTIVE SLEEP APNEA ON CPAP: ICD-10-CM

## 2023-03-20 DIAGNOSIS — J45.909 SEVERE ASTHMA WITHOUT COMPLICATION, UNSPECIFIED WHETHER PERSISTENT: ICD-10-CM

## 2023-03-20 DIAGNOSIS — I77.810 AORTIC ROOT DILATATION: ICD-10-CM

## 2023-03-20 DIAGNOSIS — G47.34 NOCTURNAL HYPOXEMIA: ICD-10-CM

## 2023-03-20 DIAGNOSIS — J45.909 SEVERE ASTHMA WITHOUT COMPLICATION, UNSPECIFIED WHETHER PERSISTENT: Primary | ICD-10-CM

## 2023-03-20 PROCEDURE — 94726 PULM FUNCT TST PLETHYSMOGRAP: ICD-10-PCS | Mod: S$GLB,,, | Performed by: INTERNAL MEDICINE

## 2023-03-20 PROCEDURE — 94060 PR EVAL OF BRONCHOSPASM: ICD-10-PCS | Mod: S$GLB,,, | Performed by: INTERNAL MEDICINE

## 2023-03-20 PROCEDURE — 3008F PR BODY MASS INDEX (BMI) DOCUMENTED: ICD-10-PCS | Mod: CPTII,S$GLB,, | Performed by: INTERNAL MEDICINE

## 2023-03-20 PROCEDURE — 1159F PR MEDICATION LIST DOCUMENTED IN MEDICAL RECORD: ICD-10-PCS | Mod: CPTII,S$GLB,, | Performed by: INTERNAL MEDICINE

## 2023-03-20 PROCEDURE — 3078F PR MOST RECENT DIASTOLIC BLOOD PRESSURE < 80 MM HG: ICD-10-PCS | Mod: CPTII,S$GLB,, | Performed by: INTERNAL MEDICINE

## 2023-03-20 PROCEDURE — 3008F BODY MASS INDEX DOCD: CPT | Mod: CPTII,S$GLB,, | Performed by: INTERNAL MEDICINE

## 2023-03-20 PROCEDURE — 1159F MED LIST DOCD IN RCRD: CPT | Mod: CPTII,S$GLB,, | Performed by: INTERNAL MEDICINE

## 2023-03-20 PROCEDURE — 94729 DIFFUSING CAPACITY: CPT | Mod: S$GLB,,, | Performed by: INTERNAL MEDICINE

## 2023-03-20 PROCEDURE — 3074F SYST BP LT 130 MM HG: CPT | Mod: CPTII,S$GLB,, | Performed by: INTERNAL MEDICINE

## 2023-03-20 PROCEDURE — 99215 PR OFFICE/OUTPT VISIT, EST, LEVL V, 40-54 MIN: ICD-10-PCS | Mod: 25,S$GLB,, | Performed by: INTERNAL MEDICINE

## 2023-03-20 PROCEDURE — 94060 EVALUATION OF WHEEZING: CPT | Mod: S$GLB,,, | Performed by: INTERNAL MEDICINE

## 2023-03-20 PROCEDURE — 1160F RVW MEDS BY RX/DR IN RCRD: CPT | Mod: CPTII,S$GLB,, | Performed by: INTERNAL MEDICINE

## 2023-03-20 PROCEDURE — 3074F PR MOST RECENT SYSTOLIC BLOOD PRESSURE < 130 MM HG: ICD-10-PCS | Mod: CPTII,S$GLB,, | Performed by: INTERNAL MEDICINE

## 2023-03-20 PROCEDURE — 1160F PR REVIEW ALL MEDS BY PRESCRIBER/CLIN PHARMACIST DOCUMENTED: ICD-10-PCS | Mod: CPTII,S$GLB,, | Performed by: INTERNAL MEDICINE

## 2023-03-20 PROCEDURE — 94726 PLETHYSMOGRAPHY LUNG VOLUMES: CPT | Mod: S$GLB,,, | Performed by: INTERNAL MEDICINE

## 2023-03-20 PROCEDURE — 99215 OFFICE O/P EST HI 40 MIN: CPT | Mod: 25,S$GLB,, | Performed by: INTERNAL MEDICINE

## 2023-03-20 PROCEDURE — 3078F DIAST BP <80 MM HG: CPT | Mod: CPTII,S$GLB,, | Performed by: INTERNAL MEDICINE

## 2023-03-20 PROCEDURE — 94729 PR C02/MEMBANE DIFFUSE CAPACITY: ICD-10-PCS | Mod: S$GLB,,, | Performed by: INTERNAL MEDICINE

## 2023-03-20 NOTE — PROGRESS NOTES
Subjective:    Patient Identification:   Patient ID: Kevin Singh is a 57 y.o. male.    Referring Provider:  Established patient    Chief Complaint:  Follow-up on asthma and sleep apnea    History of Present Illness:    Kevin Singh is a 57 y.o. male who presents for above problems.    He was given supplemental oxygen.  His CPAP compliance has improved but has not reached the goal yet.  There are certain nights that he has missed putting the machine on.  His more than 4 hour usage is 63% which is 19 days out of 30 days.  Has used his machine only 21/30 days.  Less than 4 hour usage is only on 2 nights which is 7%.  He is on auto CPAP of 7-17 cm of water.  His 95th percentile pressure is 16.5 cm of water with residual AHI of 11.6 and 95th percentile leak of 15.5 liter/minute.  His [place] Sleepiness scale score today is 12 compared to 11 on last follow-up.    Had 1 urgent care visit since last appointment for sinus congestion and was given steroids.  He denies that this was an asthma exacerbation.  He still has not received Spiriva which was sent to his pharmacy [facility] last time again.  His PFTs are unremarkable today.    The echocardiogram ordered by his primary care provider who has left the practice was okay except a moderately dilated aortic root.  A CT angiogram was recommended.    Review of Systems:  Review of Systems   Constitutional:  Negative for fever, chills, weight loss, weight gain, activity change, appetite change, fatigue, night sweats and weakness.   HENT:  Negative for nosebleeds, postnasal drip, rhinorrhea, sinus pressure, sore throat, trouble swallowing, voice change, congestion, ear pain and hearing loss.    Eyes:  Negative for redness and itching.   Respiratory:  Positive for somnolence. Negative for cough, hemoptysis, sputum production, choking, chest tightness, shortness of breath, wheezing, orthopnea, previous hospitialization due to pulmonary problems, asthma nighttime symptoms, pleurisy,  dyspnea on extertion, use of rescue inhaler and Paroxysmal Nocturnal Dyspnea.    Cardiovascular:  Negative for chest pain, palpitations and leg swelling.   Genitourinary:  Negative for difficulty urinating and hematuria.   Endocrine:  Negative for polydipsia, polyphagia, cold intolerance, heat intolerance and polyuria.    Musculoskeletal:  Negative for arthralgias, back pain, gait problem, joint swelling and myalgias.   Skin:  Negative for rash.   Gastrointestinal:  Negative for nausea, vomiting, abdominal pain, abdominal distention and acid reflux.   Neurological:  Negative for dizziness, syncope, weakness, light-headedness and headaches.   Hematological:  Negative for adenopathy. Does not bruise/bleed easily and no excessive bruising.   Psychiatric/Behavioral:  Negative for confusion and sleep disturbance. The patient is not nervous/anxious.        Allergies: Review of patient's allergies indicates:  No Known Allergies    Medications:      Past Medical History:      Past Medical History:   Diagnosis Date    Asthma     Enlarged prostate        Family History:      Family History   Problem Relation Age of Onset    Multiple sclerosis Mother     Diabetes Father     Hypertension Father     Prostatitis Father         Social History:      Past Surgical History:   Procedure Laterality Date    HERNIA REPAIR      SINUS SURGERY      polups        Physical Exam:  Vitals:    03/20/23 0935   BP: 99/70   Pulse: 85   Resp: 17     Physical Exam   Constitutional: He is oriented to person, place, and time. He appears not cachectic. No distress. He is obese.   HENT:   Head: Normocephalic.   Right Ear: External ear normal.   Left Ear: External ear normal.   Nose: Nose normal. No mucosal edema. No polyps.   Mouth/Throat: Oropharynx is clear and moist. Normal dentition. No oropharyngeal exudate. Mallampati Score: III.   Neck: No JVD present. No tracheal deviation present. No thyromegaly present.   Cardiovascular: Normal rate, regular  rhythm, normal heart sounds and intact distal pulses. Exam reveals no gallop and no friction rub.   No murmur heard.  Pulmonary/Chest: Normal expansion, symmetric chest wall expansion, effort normal and breath sounds normal. No stridor. No respiratory distress. He has no decreased breath sounds. He has no wheezes. He has no rhonchi. He has no rales. Chest wall is not dull to percussion. He exhibits no tenderness. Negative for egophony. Negative for tactile fremitus.   Abdominal: Soft. Bowel sounds are normal. He exhibits no distension and no mass. There is no hepatosplenomegaly. There is no abdominal tenderness. There is no rebound and no guarding. No hernia.   Musculoskeletal:         General: No tenderness, deformity or edema. Normal range of motion.      Cervical back: Normal range of motion and neck supple.   Lymphadenopathy: No supraclavicular adenopathy is present.     He has no cervical adenopathy.     He has no axillary adenopathy.   Neurological: He is alert and oriented to person, place, and time. He has normal reflexes. He displays normal reflexes. No cranial nerve deficit. He exhibits normal muscle tone.   Skin: Skin is warm and dry. No rash noted. He is not diaphoretic. No cyanosis or erythema. No pallor. Nails show no clubbing.   Psychiatric: He has a normal mood and affect. His behavior is normal. Judgment and thought content normal.          Accessory Clinical Data:  Chest x-ray:    CT scan:     PFTs:  Normal profile    6MWT:     TTE:  Normal RVSP, dilated RA, normal LVEF and diastolic function.  Dilated aortic root    Lab data:    All radiographic imaging of the chest, PFT tracings/data, and 6MWT data have been independently reviewed and interpreted unless otherwise specified.     Assessment and Plan:        Problem List Items Addressed This Visit          Pulmonary    Asthma - Primary    Relevant Medications    tiotropium bromide (SPIRIVA RESPIMAT) 2.5 mcg/actuation inhaler     Other Visit  Diagnoses       Aortic root dilatation        Relevant Orders    Ambulatory referral/consult to Cardiothoracic Surgery    Obstructive sleep apnea on CPAP        Nocturnal hypoxemia               Referred to CT surgery for surveillance and management of aortic root dilatation.    Patient needs to establish with a new primary care provider.    PFTs look good today.  Asthma control is better but still needs to add Spiriva to Symbicort.  Prescription will be recent to his pharmacy.    He needs to improve his CPAP compliance.      He should continue CPAP with supplemental oxygen.  A portable concentrator order will be sent to his DME as he is traveling and unable to carry the oxygen cylinder with him.    He may need a repeat study if his ESS is elevated and AHI remains uncontrolled.  Current 95th percentile pressure is touching the maximum pressure limit set.  If improving the compliance does not improve the symptoms or AHI, we will need to increase the upper limit of the auto CPAP or do a repeat titration as he may need a BiPAP.    More than 50% of 40 min time was spent face-to-face on counseling, in reviewing the echocardiogram report, compliance report, reviewing notes from primary care provider, performing appropriate examination, counseling and educating the patient regarding the findings on echocardiogram, today's PFTs, CPAP compliance report, ordering medications and appropriate follow-up studies, documenting clinical information in the electronic medical record, care coordination as well as communicating with the primary referring physician.    Follow up in about 3 months (around 6/20/2023) for with me with CPAP compliance.     This note is dictated on M*Modal word recognition program.  There are word recognition mistakes that are occasionally missed on review.

## 2023-03-31 ENCOUNTER — OFFICE VISIT (OUTPATIENT)
Dept: CARDIOTHORACIC SURGERY | Facility: CLINIC | Age: 58
End: 2023-03-31
Payer: MEDICAID

## 2023-03-31 VITALS
WEIGHT: 271 LBS | SYSTOLIC BLOOD PRESSURE: 100 MMHG | HEART RATE: 73 BPM | TEMPERATURE: 99 F | DIASTOLIC BLOOD PRESSURE: 69 MMHG | BODY MASS INDEX: 34.78 KG/M2 | OXYGEN SATURATION: 93 % | HEIGHT: 74 IN

## 2023-03-31 DIAGNOSIS — R07.1 CHEST PAIN ON BREATHING: Primary | ICD-10-CM

## 2023-03-31 DIAGNOSIS — I77.810 AORTIC ROOT DILATATION: ICD-10-CM

## 2023-03-31 PROCEDURE — 3078F PR MOST RECENT DIASTOLIC BLOOD PRESSURE < 80 MM HG: ICD-10-PCS | Mod: CPTII,S$GLB,, | Performed by: THORACIC SURGERY (CARDIOTHORACIC VASCULAR SURGERY)

## 2023-03-31 PROCEDURE — 3078F DIAST BP <80 MM HG: CPT | Mod: CPTII,S$GLB,, | Performed by: THORACIC SURGERY (CARDIOTHORACIC VASCULAR SURGERY)

## 2023-03-31 PROCEDURE — 3008F PR BODY MASS INDEX (BMI) DOCUMENTED: ICD-10-PCS | Mod: CPTII,S$GLB,, | Performed by: THORACIC SURGERY (CARDIOTHORACIC VASCULAR SURGERY)

## 2023-03-31 PROCEDURE — 1159F MED LIST DOCD IN RCRD: CPT | Mod: CPTII,S$GLB,, | Performed by: THORACIC SURGERY (CARDIOTHORACIC VASCULAR SURGERY)

## 2023-03-31 PROCEDURE — 99205 PR OFFICE/OUTPT VISIT, NEW, LEVL V, 60-74 MIN: ICD-10-PCS | Mod: S$GLB,,, | Performed by: THORACIC SURGERY (CARDIOTHORACIC VASCULAR SURGERY)

## 2023-03-31 PROCEDURE — 3074F PR MOST RECENT SYSTOLIC BLOOD PRESSURE < 130 MM HG: ICD-10-PCS | Mod: CPTII,S$GLB,, | Performed by: THORACIC SURGERY (CARDIOTHORACIC VASCULAR SURGERY)

## 2023-03-31 PROCEDURE — 3008F BODY MASS INDEX DOCD: CPT | Mod: CPTII,S$GLB,, | Performed by: THORACIC SURGERY (CARDIOTHORACIC VASCULAR SURGERY)

## 2023-03-31 PROCEDURE — 1159F PR MEDICATION LIST DOCUMENTED IN MEDICAL RECORD: ICD-10-PCS | Mod: CPTII,S$GLB,, | Performed by: THORACIC SURGERY (CARDIOTHORACIC VASCULAR SURGERY)

## 2023-03-31 PROCEDURE — 3074F SYST BP LT 130 MM HG: CPT | Mod: CPTII,S$GLB,, | Performed by: THORACIC SURGERY (CARDIOTHORACIC VASCULAR SURGERY)

## 2023-03-31 PROCEDURE — 99205 OFFICE O/P NEW HI 60 MIN: CPT | Mod: S$GLB,,, | Performed by: THORACIC SURGERY (CARDIOTHORACIC VASCULAR SURGERY)

## 2023-04-03 NOTE — PROGRESS NOTES
Subjective:      Patient ID: Kevin Singh is a 58 y.o. male who presents for evaluation of shortness of breath on exertion    Chief Complaint: No chief complaint on file.  58-year-old gentleman with history of shortness of breath and on supplemental oxygen was found to have ascending aortic aneurysm it is gradually increasing in size.  He uses CPAP  HPI 57-year-old gentleman with history of sleep apnea and on CPAP.  His usage of CPAP patient was 63% in the past few days.  He is on auto CPAP of 7-17 cm of water.  Is 95th percentile is 16.5 cm of water with the HA of 11.6.  His [place] Sleepiness scale was 12 compared to 11 the last year.  His PFTs are unremarkable.  He had echocardiogram that showed dilated aortic root.  A CT angiogram was recommended.  He denies any history of chest pain.  He is in [place] heart Association functional class 3.  No history of CVA.  No history of congestive heart failure.  Bowels are regular rhythmic solution normal.  Review of Systems   Constitutional: Negative. Negative for chills, decreased appetite, diaphoresis, fever, malaise/fatigue, night sweats, weight gain and weight loss.   HENT:  Negative for congestion, ear discharge, ear pain, hearing loss, hoarse voice, nosebleeds, odynophagia, sore throat and tinnitus.    Eyes:  Negative for blurred vision, discharge, double vision, pain, photophobia, redness, vision loss in left eye, vision loss in right eye, visual disturbance and visual halos.   Cardiovascular:  Negative for chest pain, claudication, cyanosis, dyspnea on exertion, irregular heartbeat, leg swelling, near-syncope, orthopnea, palpitations, paroxysmal nocturnal dyspnea and syncope.   Respiratory:  Negative for cough, hemoptysis, shortness of breath, sleep disturbances due to breathing, snoring and sputum production.    Endocrine: Negative for cold intolerance, heat intolerance, polydipsia, polyphagia and polyuria.   Hematologic/Lymphatic: Negative for adenopathy and  bleeding problem. Does not bruise/bleed easily.   Skin:  Negative for color change, dry skin, flushing, itching, nail changes, poor wound healing, rash, skin cancer, suspicious lesions and unusual hair distribution.   Musculoskeletal:  Negative for arthritis, back pain, falls, gout, joint pain, joint swelling, muscle cramps, muscle weakness, myalgias, neck pain and stiffness.   Gastrointestinal:  Negative for bloating, abdominal pain, anorexia, change in bowel habit, bowel incontinence, constipation, diarrhea, dysphagia, excessive appetite, flatus, heartburn, hematemesis, hematochezia, hemorrhoids, jaundice, melena, nausea and vomiting.   Genitourinary:  Negative for bladder incontinence, decreased libido, dysuria, flank pain, frequency, genital sores, hematuria, hesitancy, incomplete emptying, menorrhagia, missed menses, nocturia, non-menstrual bleeding, pelvic pain and urgency.   Neurological:  Negative for aphonia, brief paralysis, difficulty with concentration, disturbances in coordination, excessive daytime sleepiness, dizziness, focal weakness, headaches, light-headedness, loss of balance, numbness, paresthesias, seizures, sensory change, tremors, vertigo and weakness.   Psychiatric/Behavioral:  Negative for altered mental status, depression, hallucinations, hypervigilance, memory loss, substance abuse, suicidal ideas and thoughts of violence. The patient does not have insomnia and is not nervous/anxious.    Allergic/Immunologic: Negative for environmental allergies, HIV exposure, hives and persistent infections.    No fever or chills and rigors.      Past Surgical History:   Procedure Laterality Date    HERNIA REPAIR      SINUS SURGERY      polups       Family History   Problem Relation Age of Onset    Multiple sclerosis Mother     Diabetes Father     Hypertension Father     Prostatitis Father       Social History     Socioeconomic History    Marital status:     Number of children: 4   Occupational  "History    Occupation:     Tobacco Use    Smoking status: Never     Passive exposure: Past    Smokeless tobacco: Never   Substance and Sexual Activity    Alcohol use: Never    Drug use: Not Currently    Sexual activity: Yes        Medication List with Changes/Refills   Current Medications    ALBUTEROL (PROVENTIL/VENTOLIN HFA) 90 MCG/ACTUATION INHALER    Inhale 2 puffs into the lungs every 6 (six) hours. Rescue    ALFUZOSIN (UROXATRAL) 10 MG TB24    Take 1 tablet by mouth once daily.    BUDESONIDE-FORMOTEROL 160-4.5 MCG (SYMBICORT) 160-4.5 MCG/ACTUATION HFAA    Inhale 2 puffs into the lungs every 12 (twelve) hours. Controller    DUTASTERIDE (AVODART) 0.5 MG CAPSULE    Take 0.5 mg by mouth.    MONTELUKAST (SINGULAIR) 10 MG TABLET    TAKE 1 TABLET(10 MG) BY MOUTH EVERY EVENING    MUPIROCIN (BACTROBAN) 2 % OINTMENT    Apply 2 g topically 3 (three) times daily.    PREDNISONE (DELTASONE) 10 MG TABLET    Take 1 tablet twice daily x 5 days, then take 1 tablet daily x 5 days for acute asthma attack    TIOTROPIUM BROMIDE (SPIRIVA RESPIMAT) 2.5 MCG/ACTUATION INHALER    Inhale 2 puffs into the lungs Daily.        Objective:     /69 (BP Location: Left arm, Patient Position: Sitting, BP Method: Medium (Automatic))   Pulse 73   Temp 98.5 °F (36.9 °C) (Temporal)   Ht 6' 2" (1.88 m)   Wt 122.9 kg (271 lb)   SpO2 (!) 93%   BMI 34.79 kg/m²     Physical Exam  Constitutional:       General: He is not in acute distress.     Appearance: Normal appearance. He is obese. He is not ill-appearing, toxic-appearing or diaphoretic.   HENT:      Head: Normocephalic and atraumatic.      Nose: Nose normal.      Mouth/Throat:      Mouth: Mucous membranes are dry.   Eyes:      Extraocular Movements: Extraocular movements intact.      Conjunctiva/sclera: Conjunctivae normal.      Pupils: Pupils are equal, round, and reactive to light.   Cardiovascular:      Rate and Rhythm: Tachycardia present. Rhythm irregular.      Pulses: " Normal pulses.      Heart sounds: Normal heart sounds. No murmur heard.    No gallop.   Pulmonary:      Effort: Pulmonary effort is normal. No respiratory distress.      Breath sounds: Normal breath sounds. No wheezing, rhonchi or rales.   Chest:      Chest wall: No tenderness.   Abdominal:      General: Abdomen is flat. Bowel sounds are normal. There is no distension.      Palpations: Abdomen is soft.      Tenderness: There is no abdominal tenderness. There is no right CVA tenderness or guarding.   Musculoskeletal:         General: No swelling, tenderness, deformity or signs of injury. Normal range of motion.      Cervical back: Normal range of motion.      Right lower leg: No edema.      Left lower leg: No edema.   Skin:     General: Skin is warm and dry.      Capillary Refill: Capillary refill takes more than 3 seconds.      Findings: No bruising or erythema.   Neurological:      General: No focal deficit present.      Mental Status: He is alert. Mental status is at baseline. He is disoriented.      Sensory: No sensory deficit.      Motor: No weakness.      Coordination: Coordination normal.      Gait: Gait normal.   Psychiatric:         Mood and Affect: Mood normal.        Labs:  CMP  Sodium   Date Value Ref Range Status   12/05/2022 143 136 - 145 mmol/L Final     Potassium   Date Value Ref Range Status   12/05/2022 4.2 3.5 - 5.1 mmol/L Final     Chloride   Date Value Ref Range Status   12/05/2022 107 98 - 107 mmol/L Final     CO2   Date Value Ref Range Status   12/05/2022 26 22 - 29 mmol/L Final     BUN   Date Value Ref Range Status   12/05/2022 14.6 6 - 20 mg/dL Final     Creatinine   Date Value Ref Range Status   12/05/2022 0.87 0.70 - 1.20 mg/dL Final     Calcium   Date Value Ref Range Status   12/05/2022 8.9 8.6 - 10.2 mg/dL Final     Albumin   Date Value Ref Range Status   12/05/2022 4.1 3.5 - 5.2 g/dL Final     AST   Date Value Ref Range Status   12/05/2022 15 0 - 40 U/L Final     Anion Gap   Date Value  Ref Range Status   2022 10.0 8.0 - 17.0 mmol/L Final     Comment:     NOTE  Testing performed at:  [facility], [address] [place]  [address] CLIA #:[id number]        Lab Results   Component Value Date    AST 15 2022      No results found for: PT  Lab Results   Component Value Date    WBC 6.79 2022    HGB 14.6 2022    HCT 44.9 2022    MCV 90.0 2022       No results found for: BLOODTYPE    Imaging:  No results found for this or any previous visit.     No results found for this or any previous visit.      X-Ray Chest PA And Lateral                                RADIOLOGY REPORT        PT NAME: NIRAJ KHAN      [facility]     : 1965 M 56             [address]    ACCT: [id number]                                              [place], [place]    Med Rec #: [id number]                                        [id number]    Patient Location: [place].RAD/             Procedure: CHEST 2 VIEWS    REQUISITION #: [id number]      REPORT #: [id number]           DATE OF EXAM: 22    TIME OF EXAM:            PROCEDURE: CHEST 2 VIEWS    CLINICAL DATA:    Shortness of breath        TECHNIQUE:    Views:  PA and lateral views of the chest.    Limitations: The images are technically satisfactory.        COMPARISON:    No prior relevant studies are available.        FINDINGS:    Cardiovascular:    1. Chronic atherosclerotic changes noted in the aorta.        Lungs and pleura: Normal.        Mediastinal and hilar structures: Normal.        Osseous structures: Normal.        Additional findings: None seen.        IMPRESSION:    1.  Negative examination.            This document was created using a voice recognition transcribing system.   Incorrect words or phrases may have been missed during proof reading. Please   interpret accordingly or contact the radiologist for clarification if   necessary.        DICTATING PHYSICIAN:  GRISELDA FREITAS MD                    Date Dictated: 03/24/22 1118        Signed By:  GRISELDA FREITAS MD <Electronically signed by GRISELDA FREITAS MD in OV>    Date Signed:  03/24/22 1118     CC: KALANI AMBROCIO MD ; KALANI AMBROCIO MD      ADMITTING PHYSICIAN:                                                                                                    ORDERING PHY: KALANI AMBROCIO MD                                                                                                                                                      ATTENDING PHY: KALANI AMBROCIO MD    Patient Status:  REG CLI    Admit Service Date: 03/24/22        Echocardiogram:  Left ventricular cavity size is mildly increased.  Ejection fraction was 60-65%.  Right ventricle mildly increased in size and normal function.  Left atrium is normal in size.  Right atrium is dilated.  Mitral valve leaflets are normal no evidence of regurgitation or stenosis.  Aortic valve leaflets are normal no evidence of regurgitation or stenosis.  Tricuspid valve no evidence of stenosis mild regurgitation.  Aorta was dilated pore 0.8 cm in the end of diastole with evidence of moderate aortic root dilation    Pulmonary function test:  FVC was 4.68.  FEV1 was 3.68.  FEV1 to FVC ratio 87%.  DLCO was 98%.  Pulmonary function tests are essentially unremarkable  No image results found.       No results found for this visit on 03/31/23.       Pulmonary Functions Testing Results:    No results found for: FEV1, FVC, JSF2DXP, TLC, DLCO         Assessment & Plan:     58-year-old gentleman with history of sleep apnea presented with increasing shortness of breath.  His pulmonary function tests were essentially unremarkable.  Echocardiogram showed dilated aortic root.  Aortic root was measuring about 4.8 cm in end of diastole.  Aortic valve was trileaflet valve no evidence of stenosis or regurgitation.  He needs to have CT scan to have accurate measurements of the aortic root.  He does not have any  history of aortic aneurysm in the family.  He does not have Marfan or Marfan arm features.  No history of connective tissue disorders.  I told him the recommendations for aortic aneurysm repair which include size of 5.5 cm in trileaflet normal functioning aortic valve growth of more than 0.5 cm in a year history of connective tissue disorder significant aortic valve disease with the ascending aorta and aortic root measuring more than 5 cm.  He understood all the indications.  All the questions were answered.  CT angio of the chest was ordered.  After the CT angio of the chest he was ask her to have follow-up.  In his 60 minute appointment today 15 minutes was centered taken history and examined the patient and the remaining time was spent in reviewing the echocardiogram and explaining the need for follow-up and ordering the CT scan and coordinating care with other providers           Diana Somers MD

## 2023-06-22 NOTE — PROGRESS NOTES
[facility] -Cardiothoracic Vascular Surgery  Follow-up Visit       Subjective:     Chief Complaint/Reason for Clinic Visit: f/u ascending thoracic aortic aneurysm    History of Present Illness:   58-year-old gentleman with history of sleep apnea presented with increasing shortness of breath, his pulmonary function tests were essentially unremarkable.  Echocardiogram showed dilated aortic root of 4.8 cm in end of diastole.  Aortic valve was trileaflet valve no evidence of stenosis or regurgitation.  He does not have any history of aortic aneurysm in the family.  He does not have Marfan or Marfan arm features.  No history of connective tissue disorders.  It was made aware that for aortic aneurysm repair, it would need the aneurysm to be the size of 5.5 cm in trileaflet normal functioning aortic valve growth of more than 0.5 cm in a year history of connective tissue disorder significant aortic valve disease with the ascending aorta and aortic root measuring more than 5 cm.  He understood all the indications.  All the questions were answered.  CT angio of the chest was ordered.      Patient is here today to follow up after CTA.     IMPRESSION:    1.  Negative for aortic dissection  2.  Ectasia of the ascending thoracic aorta, measuring 4.6 cm  3.  Mild bibasilar atelectasis  4.  Cholelithiasis  5.  Small right adrenal nodule. This is incompletely characterized, but most likely an adenoma    Current Outpatient Medications on File Prior to Visit   Medication Sig Dispense Refill    albuterol (PROVENTIL/VENTOLIN HFA) 90 mcg/actuation inhaler Inhale 2 puffs into the lungs every 6 (six) hours. Rescue 8.5 g 1    alfuzosin (UROXATRAL) 10 mg Tb24 Take 1 tablet by mouth once daily.      budesonide-formoterol 160-4.5 mcg (SYMBICORT) 160-4.5 mcg/actuation HFAA Inhale 2 puffs into the lungs every 12 (twelve) hours. Controller 0.09 g 5    dutasteride (AVODART) 0.5 mg capsule Take 0.5 mg by mouth.      montelukast (SINGULAIR) 10 mg  tablet TAKE 1 TABLET(10 MG) BY MOUTH EVERY EVENING 30 tablet 0    mupirocin (BACTROBAN) 2 % ointment Apply 2 g topically 3 (three) times daily.      predniSONE (DELTASONE) 10 MG tablet Take 1 tablet twice daily x 5 days, then take 1 tablet daily x 5 days for acute asthma attack (Patient not taking: Reported on 3/20/2023) 15 tablet 0     No current facility-administered medications on file prior to visit.       Review of patient's allergies indicates:  No Known Allergies  Past Medical History:   Diagnosis Date    Asthma     Enlarged prostate        Past Surgical History:   Procedure Laterality Date    HERNIA REPAIR      SINUS SURGERY      polups        Family History   Problem Relation Age of Onset    Multiple sclerosis Mother     Diabetes Father     Hypertension Father     Prostatitis Father        Social History     Socioeconomic History    Marital status:     Number of children: 4   Occupational History    Occupation: safety tech    Tobacco Use    Smoking status: Never     Passive exposure: Past    Smokeless tobacco: Never   Substance and Sexual Activity    Alcohol use: Never    Drug use: Not Currently    Sexual activity: Yes     ANGIO CHEST    HISTORY:  CHEST PAIN ON BREATHING    COMPARISON: None    TECHNIQUE: Axial images were obtained from the thoracic inlet through the lung bases following intravenous administration of 100 mL Isovue-370 per CT angiogram protocol. Automated exposure technique was utilized for dose reduction. Multiplanar reformats were made.  RADIATION DOSE: 14.56 mSv    FINDINGS:    Lower Neck: Normal appearance.    Heart: Normal in size. No pericardial effusion.    Aorta: Ectasia of the ascending thoracic aorta, measuring 4.6 cm. Normal caliber arch and descending thoracic aorta. Negative for dissection. Great vessel origins are widely patent    Pulmonary Vasculature: The central pulmonary arteries are normal in caliber. There is no visible pulmonary arterial embolus.    Mediastinum:  Normal appearance. No pathologically enlarged lymph nodes.    Lungs: Bibasilar atelectasis. Findings of old granulomatous disease. No acute pulmonary abnormalities.    Pleural Space: No pneumothorax or pleural effusion    Bones: No fracture or malalignment of the thoracic spine or ribs. No suspicious bony abnormalities.    Soft Tissues: Normal appearance    Upper abdomen: Cholelithiasis. Small right adrenal nodule. Fundoplication    IMPRESSION:    1.  Negative for aortic dissection  2.  Ectasia of the ascending thoracic aorta, measuring 4.6 cm  3.  Mild bibasilar atelectasis  4.  Cholelithiasis  5.  Small right adrenal nodule. This is incompletely characterized, but most likely an adenoma    Signer Name: Chucho Medrano Jr, MD  Signed: 6/20/2023 2:04 PM CDT  ()  DICTATING PHYSICIAN:  SITA MEDRANO MD                Date Dictated: 06/20/23 1355      Review of Systems   Constitutional: Negative.    HENT: Negative.     Eyes: Negative.    Respiratory: Negative.     Cardiovascular: Negative.    Gastrointestinal: Negative.    Genitourinary: Negative.    Musculoskeletal: Negative.    Skin: Negative.    Neurological: Negative.    Psychiatric/Behavioral: Negative.       Objective:   There were no vitals taken for this visit.    Physical Exam  Constitutional:       Appearance: Normal appearance. He is normal weight.   HENT:      Head: Normocephalic and atraumatic.   Eyes:      Extraocular Movements: Extraocular movements intact.      Pupils: Pupils are equal, round, and reactive to light.   Cardiovascular:      Rate and Rhythm: Normal rate and regular rhythm.   Abdominal:      General: Abdomen is flat. Bowel sounds are normal.      Palpations: Abdomen is soft.   Musculoskeletal:         General: Normal range of motion.      Cervical back: Normal range of motion and neck supple.   Skin:     General: Skin is warm and dry.   Neurological:      General: No focal deficit present.      Mental Status: He is alert and oriented  to person, place, and time. Mental status is at baseline.   Psychiatric:         Mood and Affect: Mood normal.         Behavior: Behavior normal.         Thought Content: Thought content normal.         Judgment: Judgment normal.       Assessment/Plan:     58-year-old gentleman with history of sleep apnea presented with increasing shortness of breath, his pulmonary function tests were essentially unremarkable.  Echocardiogram showed dilated aortic root of 4.8 cm in end of diastole.  Aortic valve was trileaflet valve no evidence of stenosis or regurgitation.  He does not have any history of aortic aneurysm in the family.  He does not have Marfan or Marfan arm features.  No history of connective tissue disorders. CTA chest images reviewed. Patient's ascending thoracic aortic aneurysm measured at 4.5 cm.    It was made aware that for aortic aneurysm repair, it would need the aneurysm to be the size of 5.5 cm in trileaflet normal functioning aortic valve growth of more than 0.5 cm in a year history of connective tissue disorder significant aortic valve disease with the ascending aorta and aortic root measuring more than 5 cm.   Advised patient to have blood pressure tightly control less than 130 systolic and have repeat CT of chest in 1 year with Dr. Somers. He understood all the indications.  All the questions were answered.     Sera Perales, SUSHANT  Cardiothoracic and Vascular Surgery

## 2023-06-23 ENCOUNTER — OFFICE VISIT (OUTPATIENT)
Dept: CARDIOTHORACIC SURGERY | Facility: CLINIC | Age: 58
End: 2023-06-23
Payer: MEDICAID

## 2023-06-23 VITALS
TEMPERATURE: 98 F | OXYGEN SATURATION: 94 % | HEART RATE: 79 BPM | HEIGHT: 75 IN | DIASTOLIC BLOOD PRESSURE: 81 MMHG | SYSTOLIC BLOOD PRESSURE: 129 MMHG | WEIGHT: 266 LBS | BODY MASS INDEX: 33.07 KG/M2

## 2023-06-23 DIAGNOSIS — I71.21 ANEURYSM OF ASCENDING AORTA WITHOUT RUPTURE: Primary | ICD-10-CM

## 2023-06-23 PROCEDURE — 3074F PR MOST RECENT SYSTOLIC BLOOD PRESSURE < 130 MM HG: ICD-10-PCS | Mod: CPTII,S$GLB,, | Performed by: THORACIC SURGERY (CARDIOTHORACIC VASCULAR SURGERY)

## 2023-06-23 PROCEDURE — 1159F MED LIST DOCD IN RCRD: CPT | Mod: CPTII,S$GLB,, | Performed by: THORACIC SURGERY (CARDIOTHORACIC VASCULAR SURGERY)

## 2023-06-23 PROCEDURE — 99214 PR OFFICE/OUTPT VISIT, EST, LEVL IV, 30-39 MIN: ICD-10-PCS | Mod: S$GLB,,, | Performed by: THORACIC SURGERY (CARDIOTHORACIC VASCULAR SURGERY)

## 2023-06-23 PROCEDURE — 3079F PR MOST RECENT DIASTOLIC BLOOD PRESSURE 80-89 MM HG: ICD-10-PCS | Mod: CPTII,S$GLB,, | Performed by: THORACIC SURGERY (CARDIOTHORACIC VASCULAR SURGERY)

## 2023-06-23 PROCEDURE — 99214 OFFICE O/P EST MOD 30 MIN: CPT | Mod: S$GLB,,, | Performed by: THORACIC SURGERY (CARDIOTHORACIC VASCULAR SURGERY)

## 2023-06-23 PROCEDURE — 3008F PR BODY MASS INDEX (BMI) DOCUMENTED: ICD-10-PCS | Mod: CPTII,S$GLB,, | Performed by: THORACIC SURGERY (CARDIOTHORACIC VASCULAR SURGERY)

## 2023-06-23 PROCEDURE — 3008F BODY MASS INDEX DOCD: CPT | Mod: CPTII,S$GLB,, | Performed by: THORACIC SURGERY (CARDIOTHORACIC VASCULAR SURGERY)

## 2023-06-23 PROCEDURE — 3074F SYST BP LT 130 MM HG: CPT | Mod: CPTII,S$GLB,, | Performed by: THORACIC SURGERY (CARDIOTHORACIC VASCULAR SURGERY)

## 2023-06-23 PROCEDURE — 1159F PR MEDICATION LIST DOCUMENTED IN MEDICAL RECORD: ICD-10-PCS | Mod: CPTII,S$GLB,, | Performed by: THORACIC SURGERY (CARDIOTHORACIC VASCULAR SURGERY)

## 2023-06-23 PROCEDURE — 3079F DIAST BP 80-89 MM HG: CPT | Mod: CPTII,S$GLB,, | Performed by: THORACIC SURGERY (CARDIOTHORACIC VASCULAR SURGERY)

## 2023-07-18 ENCOUNTER — OFFICE VISIT (OUTPATIENT)
Dept: PULMONOLOGY | Facility: CLINIC | Age: 58
End: 2023-07-18
Payer: MEDICAID

## 2023-07-18 VITALS
HEIGHT: 75 IN | BODY MASS INDEX: 34.15 KG/M2 | OXYGEN SATURATION: 93 % | SYSTOLIC BLOOD PRESSURE: 100 MMHG | HEART RATE: 95 BPM | WEIGHT: 274.63 LBS | RESPIRATION RATE: 17 BRPM | DIASTOLIC BLOOD PRESSURE: 63 MMHG

## 2023-07-18 DIAGNOSIS — G47.33 OSA (OBSTRUCTIVE SLEEP APNEA): Primary | ICD-10-CM

## 2023-07-18 DIAGNOSIS — J45.909 ASTHMA, UNSPECIFIED ASTHMA SEVERITY, UNSPECIFIED WHETHER COMPLICATED, UNSPECIFIED WHETHER PERSISTENT: ICD-10-CM

## 2023-07-18 PROCEDURE — 3008F BODY MASS INDEX DOCD: CPT | Mod: CPTII,S$GLB,,

## 2023-07-18 PROCEDURE — 1159F MED LIST DOCD IN RCRD: CPT | Mod: CPTII,S$GLB,,

## 2023-07-18 PROCEDURE — 3008F PR BODY MASS INDEX (BMI) DOCUMENTED: ICD-10-PCS | Mod: CPTII,S$GLB,,

## 2023-07-18 PROCEDURE — 3078F DIAST BP <80 MM HG: CPT | Mod: CPTII,S$GLB,,

## 2023-07-18 PROCEDURE — 3078F PR MOST RECENT DIASTOLIC BLOOD PRESSURE < 80 MM HG: ICD-10-PCS | Mod: CPTII,S$GLB,,

## 2023-07-18 PROCEDURE — 99215 OFFICE O/P EST HI 40 MIN: CPT | Mod: S$GLB,,,

## 2023-07-18 PROCEDURE — 99215 PR OFFICE/OUTPT VISIT, EST, LEVL V, 40-54 MIN: ICD-10-PCS | Mod: S$GLB,,,

## 2023-07-18 PROCEDURE — 1159F PR MEDICATION LIST DOCUMENTED IN MEDICAL RECORD: ICD-10-PCS | Mod: CPTII,S$GLB,,

## 2023-07-18 PROCEDURE — 3074F SYST BP LT 130 MM HG: CPT | Mod: CPTII,S$GLB,,

## 2023-07-18 PROCEDURE — 3074F PR MOST RECENT SYSTOLIC BLOOD PRESSURE < 130 MM HG: ICD-10-PCS | Mod: CPTII,S$GLB,,

## 2023-07-18 RX ORDER — BUDESONIDE AND FORMOTEROL FUMARATE DIHYDRATE 160; 4.5 UG/1; UG/1
2 AEROSOL RESPIRATORY (INHALATION) EVERY 12 HOURS
Qty: 0.09 G | Refills: 5 | Status: SHIPPED | OUTPATIENT
Start: 2023-07-18 | End: 2024-07-17

## 2023-07-18 RX ORDER — ALBUTEROL SULFATE 90 UG/1
2 AEROSOL, METERED RESPIRATORY (INHALATION) EVERY 6 HOURS
Qty: 8.5 G | Refills: 1 | Status: SHIPPED | OUTPATIENT
Start: 2023-07-18 | End: 2023-10-25

## 2023-07-18 RX ORDER — MONTELUKAST SODIUM 10 MG/1
10 TABLET ORAL NIGHTLY
Qty: 30 TABLET | Refills: 0 | Status: SHIPPED | OUTPATIENT
Start: 2023-07-18 | End: 2023-08-17

## 2023-07-18 RX ORDER — TIOTROPIUM BROMIDE INHALATION SPRAY 3.12 UG/1
5 SPRAY, METERED RESPIRATORY (INHALATION) DAILY
Qty: 4 G | Refills: 6 | Status: SHIPPED | OUTPATIENT
Start: 2023-07-18

## 2023-07-18 NOTE — ASSESSMENT & PLAN NOTE
I will send another prescription for Singulair and Spiriva.    Current regimen will be as follows:  Symbicort 2 puffs twice a day  Spiriva 2 puffs once a day  Singulair 10 mg once daily  Use over-the-counter intranasal steroids on a daily basis.  Use albuterol rescue inhaler every 4 6 hours as needed.      Follow-up in 4 months

## 2023-07-18 NOTE — ASSESSMENT & PLAN NOTE
We will plan to repeat sleep study with titration due to his persistently elevated ESS and borderline AHI.  He may need BiPAP.  CPAP pressures are running on the upper end of the set limits.    We will follow-up after repeat study.  I have told him to continue using his CPAP every day in the meantime.

## 2023-07-18 NOTE — PROGRESS NOTES
Subjective:    Patient Identification:  Patient ID: Kevin Singh is a 58 y.o. male.    Referring Provider:  No ref. provider found     Chief Complaint:  Sleep Apnea and Asthma      History of Present Illness:    Kevin Singh is a 58 y.o. male who presents for a sleep apnea  and asthma follow-up.  His average AHI on his compliance report is 8.9 which is an improvement from last visit were was 11.6.  His compliance is about the same as his last visit.  He is only 77% compliant with his machine with usage of 23/30 days.  His 95th percentile pressure is 15 and 95th percentile leak is 33.4.  He says that he is in need of new head gear mask filters and hose.  His ESS today is 14 which is worse than last visit where it was 12.    In terms of his asthma, Spiriva was added to Symbicort last visit and he was supposed to be prescribed Singulair but he is never gotten his Singulair.  He also has not received his Spiriva.  He sounds very congested today.  He says that he does not currently use any intranasal steroids.  He says that he has not had use his rescue inhaler in a few weeks but from time to time he feels like he needs to use his oxygen that is supposed to be bled through his CPAP machine during the day.    He has been seen by CT surgery for aortic root dilation.  He is to repeat a CTA and follow up with Dr. Duarte in 1 year.  No surgical intervention planned at this time.  Review of Systems:  Review of Systems   Constitutional:  Negative for fever, chills, appetite change, night sweats and weakness.   HENT:  Positive for sinus pressure and congestion. Negative for postnasal drip, rhinorrhea, sore throat, trouble swallowing, voice change and ear pain.    Respiratory:  Positive for shortness of breath, dyspnea on extertion and use of rescue inhaler. Negative for cough, hemoptysis and sputum production.    Cardiovascular:  Negative for chest pain, palpitations and leg swelling.   Genitourinary:  Negative for difficulty  "urinating and hematuria.   Endocrine:  Negative for polydipsia, polyphagia, cold intolerance, heat intolerance and polyuria.    Musculoskeletal:  Negative for joint swelling and myalgias.   Skin:  Negative for rash.   Gastrointestinal:  Negative for nausea, vomiting, abdominal pain and abdominal distention.   Neurological:  Negative for dizziness, syncope, light-headedness and headaches.   Psychiatric/Behavioral:  Negative for confusion and sleep disturbance. The patient is not nervous/anxious.      Allergies: Review of patient's allergies indicates:  No Known Allergies    Medications:      Past Medical History:      Past Medical History:   Diagnosis Date    Asthma     Enlarged prostate     Sleep apnea        Family History:      Family History   Problem Relation Age of Onset    Multiple sclerosis Mother     Diabetes Father     Hypertension Father     Prostatitis Father         Social History:      Past Surgical History:   Procedure Laterality Date    HERNIA REPAIR      SINUS SURGERY      polups        Physical Exam:  /63 (BP Location: Left arm, Patient Position: Sitting, BP Method: Large (Automatic))   Pulse 95   Resp 17   Ht 6' 3" (1.905 m)   Wt 124.6 kg (274 lb 9.6 oz)   SpO2 (!) 93%   BMI 34.32 kg/m²   Physical Exam        No results found for: PREFEV1, OMJ9MCWUNI, PREFVC, FVCPREREF, DKDWYU1UYY, NSP6BHGUEDW, ROQK8ZOD, UPJY9MCQ, PREDLCO, DLCOSBPRRF, DLCOADJPRE, DLCOCSBRPRRF, POSTFEV1, POSTFVC, CPHBNGA4WPB   CTA Chest Non-Coronary (PE Studies)                                RADIOLOGY REPORT        PT NAME: NIRAJ KHAN      [facility]     : 1965 M 58             [address]    ACCT: [id number]                                              [facility] Rec #: [id number]                                        [id number]    [facility]             Procedure: ANGIO CHEST    REQUISITION #: [id number]      REPORT #: [id number]           DATE OF EXAM: " 06/20/23    TIME OF EXAM: 1108       ANGIO CHEST        HISTORY:  CHEST PAIN ON BREATHING        COMPARISON: None        TECHNIQUE: Axial images were obtained from the thoracic inlet through the   lung bases following intravenous administration of 100 mL Isovue-370 per CT    angiogram protocol. Automated exposure technique was utilized for dose   reduction. Multiplanar reformats were made.    RADIATION DOSE: 14.56 mSv        FINDINGS:        Lower Neck: Normal appearance.        Heart: Normal in size. No pericardial effusion.        Aorta: Ectasia of the ascending thoracic aorta, measuring 4.6 cm. Normal   caliber arch and descending thoracic aorta. Negative for dissection. Great   vessel origins are widely patent        Pulmonary Vasculature: The central pulmonary arteries are normal in caliber.   There is no visible pulmonary arterial embolus.        Mediastinum: Normal appearance. No pathologically enlarged lymph nodes.        Lungs: Bibasilar atelectasis. Findings of old granulomatous disease. No   acute pulmonary abnormalities.        Pleural Space: No pneumothorax or pleural effusion        Bones: No fracture or malalignment of the thoracic spine or ribs. No   suspicious bony abnormalities.        Soft Tissues: Normal appearance        Upper abdomen: Cholelithiasis. Small right adrenal nodule. Fundoplication        IMPRESSION:        1.  Negative for aortic dissection    2.  Ectasia of the ascending thoracic aorta, measuring 4.6 cm    3.  Mild bibasilar atelectasis    4.  Cholelithiasis    5.  Small right adrenal nodule. This is incompletely characterized, but most   likely an adenoma        Signer Name: Chucho Medrano Jr, MD    Signed: 6/20/2023 2:04 PM CDT    ()        DICTATING PHYSICIAN:  SITA MEDRANO MD                   Date Dictated: 06/20/23 1350        Signed By:  SITA MEDRANO MD     Date Signed:  06/20/23 1401     CC: NIKI LAZO MD ; NIKI LAZO MD      ADMITTING PHYSICIAN:                                                                                                     ORDERING PHY: NIKI LAZO MD                                                                                                                                                      ATTENDING PHY: NIKI LAZO MD    Patient Status:  REG CLI    Admit Service Date: 06/20/23                Accessory Clinical Data:  Chest x-ray:    CT scan:     PFTs:     6MWT:     TTE:    Lab data:    All radiographic imaging of the chest, PFT tracings/data, and 6MWT data have been independently reviewed and interpreted unless otherwise specified.     Assessment and Plan:      Problem List Items Addressed This Visit          Pulmonary    Asthma    Current Assessment & Plan     I will send another prescription for Singulair and Spiriva.    Current regimen will be as follows:  Symbicort 2 puffs twice a day  Spiriva 2 puffs once a day  Singulair 10 mg once daily  Use over-the-counter intranasal steroids on a daily basis.  Use albuterol rescue inhaler every 4 6 hours as needed.      Follow-up in 4 months         Relevant Medications    tiotropium bromide (SPIRIVA RESPIMAT) 2.5 mcg/actuation inhaler    budesonide-formoterol 160-4.5 mcg (SYMBICORT) 160-4.5 mcg/actuation HFAA    albuterol (PROVENTIL/VENTOLIN HFA) 90 mcg/actuation inhaler    montelukast (SINGULAIR) 10 mg tablet       Other    JUAN FRANCISCO (obstructive sleep apnea) - Primary    Current Assessment & Plan     We will plan to repeat sleep study with titration due to his persistently elevated ESS and borderline AHI.  He may need BiPAP.  CPAP pressures are running on the upper end of the set limits.    We will follow-up after repeat study.  I have told him to continue using his CPAP every day in the meantime.         Relevant Orders    Nursing communication    BiPAP/CPAP Titration ((Must have dx of JUAN FRANCISCO from previous sleep study)      Orders Placed This Encounter   Procedures    Nursing  communication     Please send order to [facility] for head gear, mask, filters, and hose.  Please arrange for repeat in-lab titration study.    BiPAP/CPAP Titration ((Must have dx of JUAN FRANCISCO from previous sleep study)     Standing Status:   Future     Standing Expiration Date:   7/18/2024     Order Specific Question:   Titration Type:     Answer:   CPAP        High complexity case.  60 min were spent in reviewing the important data including imaging studies on a different EMR, laboratory data, notes from other consultants and reviewing care with other providers with more than 50% of the time spent face-to-face on counseling, explaining the disease process, progression, importance of compliance with prescribed medications and the importance of follow-up.    Follow up in about 4 months (around 11/18/2023) for medication follow up and JUAN FRANCISCO.

## 2023-09-27 DIAGNOSIS — J45.909 SEVERE ASTHMA WITHOUT COMPLICATION, UNSPECIFIED WHETHER PERSISTENT: Primary | ICD-10-CM

## 2023-09-27 DIAGNOSIS — J45.909 ASTHMA, UNSPECIFIED ASTHMA SEVERITY, UNSPECIFIED WHETHER COMPLICATED, UNSPECIFIED WHETHER PERSISTENT: ICD-10-CM

## 2023-10-05 RX ORDER — MONTELUKAST SODIUM 10 MG/1
TABLET ORAL
Qty: 30 TABLET | Refills: 0 | Status: SHIPPED | OUTPATIENT
Start: 2023-10-05

## 2023-10-09 RX ORDER — MONTELUKAST SODIUM 10 MG/1
10 TABLET ORAL NIGHTLY
Qty: 30 TABLET | Refills: 0 | Status: SHIPPED | OUTPATIENT
Start: 2023-10-09

## 2023-10-11 DIAGNOSIS — J45.909 ASTHMA, UNSPECIFIED ASTHMA SEVERITY, UNSPECIFIED WHETHER COMPLICATED, UNSPECIFIED WHETHER PERSISTENT: Primary | ICD-10-CM

## 2023-10-11 DIAGNOSIS — J45.909 ASTHMA, UNSPECIFIED ASTHMA SEVERITY, UNSPECIFIED WHETHER COMPLICATED, UNSPECIFIED WHETHER PERSISTENT: ICD-10-CM

## 2023-10-25 RX ORDER — ALBUTEROL SULFATE 90 UG/1
AEROSOL, METERED RESPIRATORY (INHALATION)
Qty: 8.5 G | Refills: 1 | Status: SHIPPED | OUTPATIENT
Start: 2023-10-25

## 2023-10-27 ENCOUNTER — TELEPHONE (OUTPATIENT)
Dept: PULMONOLOGY | Facility: CLINIC | Age: 58
End: 2023-10-27
Payer: MEDICAID

## 2024-06-14 DIAGNOSIS — I71.21 ANEURYSM OF ASCENDING AORTA WITHOUT RUPTURE: Primary | ICD-10-CM
